# Patient Record
Sex: FEMALE | Race: WHITE | Employment: UNEMPLOYED | URBAN - METROPOLITAN AREA
[De-identification: names, ages, dates, MRNs, and addresses within clinical notes are randomized per-mention and may not be internally consistent; named-entity substitution may affect disease eponyms.]

---

## 2018-04-13 PROCEDURE — 81003 URINALYSIS AUTO W/O SCOPE: CPT | Performed by: FAMILY MEDICINE

## 2018-04-13 PROCEDURE — 82607 VITAMIN B-12: CPT | Performed by: FAMILY MEDICINE

## 2018-04-13 PROCEDURE — 87086 URINE CULTURE/COLONY COUNT: CPT | Performed by: FAMILY MEDICINE

## 2018-04-13 PROCEDURE — 82746 ASSAY OF FOLIC ACID SERUM: CPT | Performed by: FAMILY MEDICINE

## 2018-06-06 PROCEDURE — 88175 CYTOPATH C/V AUTO FLUID REDO: CPT | Performed by: INTERNAL MEDICINE

## 2018-11-02 PROCEDURE — 83516 IMMUNOASSAY NONANTIBODY: CPT | Performed by: FAMILY MEDICINE

## 2022-01-01 ENCOUNTER — HOSPITAL ENCOUNTER (OUTPATIENT)
Age: 37
Discharge: HOME | End: 2022-01-01
Payer: COMMERCIAL

## 2022-01-01 VITALS
RESPIRATION RATE: 18 BRPM | HEART RATE: 84 BPM | OXYGEN SATURATION: 100 % | WEIGHT: 105 LBS | BODY MASS INDEX: 18.61 KG/M2 | TEMPERATURE: 98 F | HEIGHT: 63 IN | DIASTOLIC BLOOD PRESSURE: 60 MMHG | SYSTOLIC BLOOD PRESSURE: 97 MMHG

## 2022-01-01 DIAGNOSIS — B34.9 VIRAL ILLNESS: Primary | ICD-10-CM

## 2022-01-01 LAB — S PYO AG THROAT QL: NEGATIVE

## 2022-01-01 PROCEDURE — 99203 OFFICE O/P NEW LOW 30 MIN: CPT | Performed by: NURSE PRACTITIONER

## 2022-01-01 PROCEDURE — 87880 STREP A ASSAY W/OPTIC: CPT | Performed by: NURSE PRACTITIONER

## 2022-01-01 NOTE — ED PROVIDER NOTES
Patient Seen in: [facility]      History   Patient presents with:  Sore Throat    Stated Complaint: sore throat    Subjective:   HPI    42 Yo arrives to the ic with co sore throat, tolerating secretions, phonation normal, neck supple, uvula m Ear: External ear normal.      Left Ear: External ear normal.      Nose: Nose normal.      Mouth/Throat:      Mouth: Mucous membranes are moist.      Pharynx: Oropharynx is clear. Uvula midline.  No oropharyngeal exudate, posterior oropharyngeal erythema or explained that emergent conditions may arise and to go to the ER for new, worsening or any persistent conditions. I've explained the importance of following up with their doctor as instructed.  The patient verbalized understanding of the discharge instructi

## 2022-01-01 NOTE — ED INITIAL ASSESSMENT (HPI)
Pt here w c/o sore throat since yesterday and pain w swallowing. No cough, no fever, no runny nose or congestion. Pt states she is concerned to strep. Pt states she just had covid 3 wks ago.

## 2022-06-23 ENCOUNTER — LAB ENCOUNTER (OUTPATIENT)
Dept: LAB | Age: 37
End: 2022-06-23
Attending: STUDENT IN AN ORGANIZED HEALTH CARE EDUCATION/TRAINING PROGRAM
Payer: COMMERCIAL

## 2022-06-23 DIAGNOSIS — R74.8 ELEVATED VITAMIN B12 LEVEL: ICD-10-CM

## 2022-06-23 LAB
ALBUMIN SERPL-MCNC: 4 G/DL (ref 3.4–5)
ALP LIVER SERPL-CCNC: 45 U/L
ALT SERPL-CCNC: 16 U/L
AST SERPL-CCNC: 17 U/L (ref 15–37)
BILIRUB DIRECT SERPL-MCNC: <0.1 MG/DL (ref 0–0.2)
BILIRUB SERPL-MCNC: 0.5 MG/DL (ref 0.1–2)
PROT SERPL-MCNC: 7.5 G/DL (ref 6.4–8.2)
VIT B12 SERPL-MCNC: 737 PG/ML (ref 193–986)

## 2022-06-23 PROCEDURE — 36415 COLL VENOUS BLD VENIPUNCTURE: CPT

## 2022-06-23 PROCEDURE — 82607 VITAMIN B-12: CPT

## 2022-06-23 PROCEDURE — 80076 HEPATIC FUNCTION PANEL: CPT

## 2022-07-18 DIAGNOSIS — F41.8 DEPRESSION WITH ANXIETY: ICD-10-CM

## 2022-07-18 RX ORDER — SERTRALINE HYDROCHLORIDE 25 MG/1
TABLET, FILM COATED ORAL
Qty: 30 TABLET | Refills: 0 | Status: SHIPPED | OUTPATIENT
Start: 2022-07-18

## 2022-10-25 DIAGNOSIS — F41.8 DEPRESSION WITH ANXIETY: ICD-10-CM

## 2022-12-29 ENCOUNTER — PATIENT MESSAGE (OUTPATIENT)
Dept: FAMILY MEDICINE CLINIC | Facility: CLINIC | Age: 37
End: 2022-12-29

## 2022-12-30 ENCOUNTER — NURSE TRIAGE (OUTPATIENT)
Dept: FAMILY MEDICINE CLINIC | Facility: CLINIC | Age: 37
End: 2022-12-30

## 2022-12-30 DIAGNOSIS — F41.8 DEPRESSION WITH ANXIETY: ICD-10-CM

## 2022-12-30 DIAGNOSIS — B00.2 ORAL HERPES: Primary | ICD-10-CM

## 2022-12-30 RX ORDER — VALACYCLOVIR HYDROCHLORIDE 500 MG/1
500 TABLET, FILM COATED ORAL DAILY
Qty: 90 TABLET | Refills: 0 | Status: SHIPPED | OUTPATIENT
Start: 2022-12-30

## 2022-12-30 NOTE — TELEPHONE ENCOUNTER
From: [place]  To: Sanjuanita Henderson MD  Sent: 12/29/2022 12:39 PM CST  Subject: Valtrex    Hi Dr. Daphnie Orozco,    I have been getting cold sores on my lips and was wondering if I could get a prescription of Valtrex. I usually get a few of them in the winter and taking Valtrex has helped reduce them. If you could let me know, that would be great! Thanks!     [facility]

## 2023-04-07 ENCOUNTER — TELEPHONE (OUTPATIENT)
Dept: FAMILY MEDICINE CLINIC | Facility: CLINIC | Age: 38
End: 2023-04-07

## 2023-04-07 ENCOUNTER — LAB ENCOUNTER (OUTPATIENT)
Dept: LAB | Age: 38
End: 2023-04-07
Attending: STUDENT IN AN ORGANIZED HEALTH CARE EDUCATION/TRAINING PROGRAM
Payer: COMMERCIAL

## 2023-04-07 DIAGNOSIS — G47.11 IDIOPATHIC HYPERSOMNIA: ICD-10-CM

## 2023-04-07 DIAGNOSIS — R53.83 FATIGUE, UNSPECIFIED TYPE: Primary | ICD-10-CM

## 2023-04-07 DIAGNOSIS — R53.83 FATIGUE, UNSPECIFIED TYPE: ICD-10-CM

## 2023-04-07 LAB
ALBUMIN SERPL-MCNC: 4.3 G/DL (ref 3.4–5)
ALBUMIN/GLOB SERPL: 1.2 {RATIO} (ref 1–2)
ALP LIVER SERPL-CCNC: 60 U/L
ALT SERPL-CCNC: 19 U/L
ANION GAP SERPL CALC-SCNC: 4 MMOL/L (ref 0–18)
AST SERPL-CCNC: 13 U/L (ref 15–37)
BILIRUB SERPL-MCNC: 0.4 MG/DL (ref 0.1–2)
BUN BLD-MCNC: 21 MG/DL (ref 7–18)
BUN/CREAT SERPL: 29.2 (ref 10–20)
CALCIUM BLD-MCNC: 9.1 MG/DL (ref 8.5–10.1)
CHLORIDE SERPL-SCNC: 107 MMOL/L (ref 98–112)
CHOLEST SERPL-MCNC: 147 MG/DL (ref ?–200)
CO2 SERPL-SCNC: 27 MMOL/L (ref 21–32)
CREAT BLD-MCNC: 0.72 MG/DL
DEPRECATED RDW RBC AUTO: 45 FL (ref 35.1–46.3)
ERYTHROCYTE [DISTWIDTH] IN BLOOD BY AUTOMATED COUNT: 13.1 % (ref 11–15)
FASTING PATIENT LIPID ANSWER: NO
FASTING STATUS PATIENT QL REPORTED: NO
GFR SERPLBLD BASED ON 1.73 SQ M-ARVRAT: 109 ML/MIN/1.73M2 (ref 60–?)
GLOBULIN PLAS-MCNC: 3.5 G/DL (ref 2.8–4.4)
GLUCOSE BLD-MCNC: 95 MG/DL (ref 70–99)
HCT VFR BLD AUTO: 38.1 %
HDLC SERPL-MCNC: 67 MG/DL (ref 40–59)
HGB BLD-MCNC: 12 G/DL
LDLC SERPL CALC-MCNC: 64 MG/DL (ref ?–100)
MCH RBC QN AUTO: 29.4 PG (ref 26–34)
MCHC RBC AUTO-ENTMCNC: 31.5 G/DL (ref 31–37)
MCV RBC AUTO: 93.4 FL
NONHDLC SERPL-MCNC: 80 MG/DL (ref ?–130)
OSMOLALITY SERPL CALC.SUM OF ELEC: 289 MOSM/KG (ref 275–295)
PLATELET # BLD AUTO: 235 10(3)UL (ref 150–450)
POTASSIUM SERPL-SCNC: 3.9 MMOL/L (ref 3.5–5.1)
PROT SERPL-MCNC: 7.8 G/DL (ref 6.4–8.2)
RBC # BLD AUTO: 4.08 X10(6)UL
SODIUM SERPL-SCNC: 138 MMOL/L (ref 136–145)
TRIGL SERPL-MCNC: 82 MG/DL (ref 30–149)
TSI SER-ACNC: 1.05 MIU/ML (ref 0.36–3.74)
VLDLC SERPL CALC-MCNC: 12 MG/DL (ref 0–30)
WBC # BLD AUTO: 8.2 X10(3) UL (ref 4–11)

## 2023-04-07 PROCEDURE — 80061 LIPID PANEL: CPT

## 2023-04-07 PROCEDURE — 36415 COLL VENOUS BLD VENIPUNCTURE: CPT

## 2023-04-07 PROCEDURE — 84443 ASSAY THYROID STIM HORMONE: CPT

## 2023-04-07 PROCEDURE — 85027 COMPLETE CBC AUTOMATED: CPT

## 2023-04-07 PROCEDURE — 80053 COMPREHEN METABOLIC PANEL: CPT

## 2023-04-07 NOTE — TELEPHONE ENCOUNTER
PA needed for Modafinil, Chart notes to match dx    Please advise on qualifying DX , Narcolepsy type 1, Narcolepsy type 2, Shift work sleep disorder, Idiopathic Hypersomnia, TEJAL-Hypopnea syndrome or Other

## 2023-04-08 ENCOUNTER — PATIENT MESSAGE (OUTPATIENT)
Dept: FAMILY MEDICINE CLINIC | Facility: CLINIC | Age: 38
End: 2023-04-08

## 2023-04-10 NOTE — TELEPHONE ENCOUNTER
From: [facility]  To: Gonzalo Campbell MD  Sent: 4/8/2023 1:42 PM CDT  Subject: Prescription     Hi Dr. Radha Mathews,    I went to  my prescription of Modafinil yesterday, and they need your authorization for the prescription to be processed through insurance. If you could sign off on that, that would be great! Thanks!     [facility]

## 2023-04-12 RX ORDER — DEXTROAMPHETAMINE SACCHARATE, AMPHETAMINE ASPARTATE MONOHYDRATE, DEXTROAMPHETAMINE SULFATE AND AMPHETAMINE SULFATE 2.5; 2.5; 2.5; 2.5 MG/1; MG/1; MG/1; MG/1
10 CAPSULE, EXTENDED RELEASE ORAL EVERY MORNING
Qty: 30 CAPSULE | Refills: 0 | Status: SHIPPED | OUTPATIENT
Start: 2023-04-12 | End: 2023-05-12

## 2023-04-12 NOTE — TELEPHONE ENCOUNTER
Prior authorization for modafinil has been denied. Please refer to denial letter that was faxed to the office. Denied    PA Case: [id number], Status: Denied. Notification: Completed.  Case ID: [id number]      Payer:  [employer]   Electronic appeal:  Not supported   View History

## 2023-05-24 ENCOUNTER — TELEMEDICINE (OUTPATIENT)
Dept: FAMILY MEDICINE CLINIC | Facility: CLINIC | Age: 38
End: 2023-05-24

## 2023-05-24 DIAGNOSIS — R53.83 FATIGUE, UNSPECIFIED TYPE: Primary | ICD-10-CM

## 2023-05-24 PROCEDURE — 99213 OFFICE O/P EST LOW 20 MIN: CPT | Performed by: STUDENT IN AN ORGANIZED HEALTH CARE EDUCATION/TRAINING PROGRAM

## 2023-05-24 RX ORDER — DEXTROAMPHETAMINE SACCHARATE, AMPHETAMINE ASPARTATE MONOHYDRATE, DEXTROAMPHETAMINE SULFATE AND AMPHETAMINE SULFATE 5; 5; 5; 5 MG/1; MG/1; MG/1; MG/1
20 CAPSULE, EXTENDED RELEASE ORAL EVERY MORNING
Qty: 30 CAPSULE | Refills: 0 | Status: SHIPPED | OUTPATIENT
Start: 2023-05-24 | End: 2023-06-23

## 2023-06-26 ENCOUNTER — PATIENT MESSAGE (OUTPATIENT)
Dept: FAMILY MEDICINE CLINIC | Facility: CLINIC | Age: 38
End: 2023-06-26

## 2023-06-26 DIAGNOSIS — B00.2 ORAL HERPES: ICD-10-CM

## 2023-06-27 ENCOUNTER — PATIENT OUTREACH (OUTPATIENT)
Dept: CASE MANAGEMENT | Age: 38
End: 2023-06-27

## 2023-06-27 ENCOUNTER — TELEPHONE (OUTPATIENT)
Dept: FAMILY MEDICINE CLINIC | Facility: CLINIC | Age: 38
End: 2023-06-27

## 2023-06-27 RX ORDER — DEXTROAMPHETAMINE SACCHARATE, AMPHETAMINE ASPARTATE MONOHYDRATE, DEXTROAMPHETAMINE SULFATE AND AMPHETAMINE SULFATE 5; 5; 5; 5 MG/1; MG/1; MG/1; MG/1
20 CAPSULE, EXTENDED RELEASE ORAL DAILY
Qty: 30 CAPSULE | Refills: 0 | Status: SHIPPED | OUTPATIENT
Start: 2023-07-28 | End: 2023-08-27

## 2023-06-27 RX ORDER — VALACYCLOVIR HYDROCHLORIDE 500 MG/1
500 TABLET, FILM COATED ORAL DAILY
Qty: 90 TABLET | Refills: 0 | Status: SHIPPED | OUTPATIENT
Start: 2023-06-27

## 2023-06-27 RX ORDER — DEXTROAMPHETAMINE SACCHARATE, AMPHETAMINE ASPARTATE MONOHYDRATE, DEXTROAMPHETAMINE SULFATE AND AMPHETAMINE SULFATE 5; 5; 5; 5 MG/1; MG/1; MG/1; MG/1
20 CAPSULE, EXTENDED RELEASE ORAL DAILY
Qty: 30 CAPSULE | Refills: 0 | Status: SHIPPED | OUTPATIENT
Start: 2023-08-28 | End: 2023-09-27

## 2023-06-27 RX ORDER — DEXTROAMPHETAMINE SACCHARATE, AMPHETAMINE ASPARTATE MONOHYDRATE, DEXTROAMPHETAMINE SULFATE AND AMPHETAMINE SULFATE 5; 5; 5; 5 MG/1; MG/1; MG/1; MG/1
20 CAPSULE, EXTENDED RELEASE ORAL DAILY
Qty: 30 CAPSULE | Refills: 0 | Status: SHIPPED | OUTPATIENT
Start: 2023-06-27 | End: 2023-07-27

## 2023-07-09 DIAGNOSIS — F41.8 DEPRESSION WITH ANXIETY: ICD-10-CM

## 2023-07-09 NOTE — TELEPHONE ENCOUNTER
Refill passed per [place] [facility] [place], [facility] protocol. Requested Prescriptions   Pending Prescriptions Disp Refills    SERTRALINE 50 MG Oral Tab [Pharmacy Med Name: SERTRALINE 50MG TABLETS] 135 tablet 0     Sig: TAKE 1 AND 1/2 TABLETS(75 MG) BY MOUTH DAILY.  INCREASE  MG 5 DAYS BEFORE ONSET OF MENSES, THROUGH DAY 5 OF MENSES       Psychiatric Non-Scheduled (Anti-Anxiety) Passed - 7/9/2023 12:39 PM        Passed - In person appointment or virtual visit in the past 6 mos or appointment in next 3 mos     Recent Outpatient Visits              1 month ago Fatigue, unspecified type    [facility], [address] [place] Bateman, MD    Telemedicine    3 months ago Depression with anxiety    Ashly Vel[facility]quez Conway Bateman, MD    Office Visit    1 year ago Depression with anxiety    Ashly Velázquez Conway Bateman, MD    Office Visit    2 years ago Anxiety disorder, unspecified type    [facility] - [address], Maritza Canada DO    Office Visit    3 years ago Recurrent cold sores    [facility] - Danica Dey MD    Office Visit                         Recent Outpatient Visits              1 month ago Fatigue, unspecified type    [address] [place], [address] Conway Bateman, MD    Telemedicine    3 months ago Depression with anxiety    Ashly Velázquez Conway Bateman, MD    Office Visit    1 year ago Depression with anxiety    Ashly Velázquez [place] Bateman, MD    Office Visit    2 years ago Anxiety disorder, unspecified type    [facility] - [address], Maritza Canada DO    Office Visit    3 years ago Recurrent cold sores    Family Medicine - Danica Dey MD    Office Visit

## 2023-08-10 RX ORDER — DEXTROAMPHETAMINE SACCHARATE, AMPHETAMINE ASPARTATE MONOHYDRATE, DEXTROAMPHETAMINE SULFATE AND AMPHETAMINE SULFATE 5; 5; 5; 5 MG/1; MG/1; MG/1; MG/1
20 CAPSULE, EXTENDED RELEASE ORAL DAILY
Qty: 30 CAPSULE | Refills: 0 | OUTPATIENT
Start: 2023-08-10 | End: 2023-09-09

## 2023-08-31 PROBLEM — E55.9 VITAMIN D DEFICIENCY: Status: ACTIVE | Noted: 2023-08-31

## 2023-08-31 PROBLEM — E53.8 COBALAMIN DEFICIENCY: Status: ACTIVE | Noted: 2023-08-31

## 2023-08-31 PROBLEM — D64.9 ANEMIA: Status: ACTIVE | Noted: 2023-08-31

## 2023-08-31 PROBLEM — F41.9 ANXIETY: Status: ACTIVE | Noted: 2023-08-31

## 2023-09-06 ENCOUNTER — OFFICE VISIT (OUTPATIENT)
Dept: FAMILY MEDICINE CLINIC | Facility: CLINIC | Age: 38
End: 2023-09-06

## 2023-09-06 VITALS
DIASTOLIC BLOOD PRESSURE: 71 MMHG | BODY MASS INDEX: 17.36 KG/M2 | HEART RATE: 99 BPM | RESPIRATION RATE: 14 BRPM | SYSTOLIC BLOOD PRESSURE: 106 MMHG | OXYGEN SATURATION: 98 % | HEIGHT: 63 IN | WEIGHT: 98 LBS

## 2023-09-06 DIAGNOSIS — F41.8 DEPRESSION WITH ANXIETY: ICD-10-CM

## 2023-09-06 DIAGNOSIS — G47.11 IDIOPATHIC HYPERSOMNIA: ICD-10-CM

## 2023-09-06 DIAGNOSIS — R41.3 MEMORY CHANGES: Primary | ICD-10-CM

## 2023-09-06 DIAGNOSIS — Z12.31 ENCOUNTER FOR SCREENING MAMMOGRAM FOR MALIGNANT NEOPLASM OF BREAST: ICD-10-CM

## 2023-09-06 PROCEDURE — 99213 OFFICE O/P EST LOW 20 MIN: CPT | Performed by: STUDENT IN AN ORGANIZED HEALTH CARE EDUCATION/TRAINING PROGRAM

## 2023-09-07 ENCOUNTER — APPOINTMENT (OUTPATIENT)
Dept: CT IMAGING | Facility: HOSPITAL | Age: 38
End: 2023-09-07
Attending: EMERGENCY MEDICINE

## 2023-09-07 PROCEDURE — 70450 CT HEAD/BRAIN W/O DYE: CPT | Performed by: EMERGENCY MEDICINE

## 2023-09-18 ENCOUNTER — TELEPHONE (OUTPATIENT)
Dept: FAMILY MEDICINE CLINIC | Facility: CLINIC | Age: 38
End: 2023-09-18

## 2023-09-20 ENCOUNTER — OFFICE VISIT (OUTPATIENT)
Dept: NEUROLOGY | Facility: CLINIC | Age: 38
End: 2023-09-20
Payer: COMMERCIAL

## 2023-09-20 VITALS — SYSTOLIC BLOOD PRESSURE: 106 MMHG | HEART RATE: 71 BPM | DIASTOLIC BLOOD PRESSURE: 71 MMHG | OXYGEN SATURATION: 100 %

## 2023-09-20 DIAGNOSIS — Z86.59 H/O: DEPRESSION: ICD-10-CM

## 2023-09-20 DIAGNOSIS — Z86.59 HISTORY OF PANIC ATTACKS: ICD-10-CM

## 2023-09-20 DIAGNOSIS — G31.84 MILD COGNITIVE IMPAIRMENT: ICD-10-CM

## 2023-09-20 DIAGNOSIS — Z91.89 H/O ANXIETY STATE: ICD-10-CM

## 2023-09-20 DIAGNOSIS — R41.3 MEMORY PROBLEM: Primary | ICD-10-CM

## 2023-09-20 NOTE — PROGRESS NOTES
[facility]  [address] [facility], [address] [place] [address]  [phone]              Date: September 20, 2023  Patient Name: Raffy Ruvalcaba   MRN: [id number]    Reason for Evaluation: memory problem     HPI:     Raffy Ruvalcaba is a 34year old female with PMHx of anxiety, vitamin D deficiency, cobalamin deficiency  who comes in for evaluation of her cognitive impairment. History was obtained from the patient and medical records. The patient lives with her spouse and 4 kids - boys   Working condition - part time, school psychologist  Level of education  - masters   Patient was at her baseline until almost 1 year ago. Symptoms were initially noticed by the patient and family, have been going on for the past 6 months, and progressively getting worse. The patient and the family have noticed ongoing problems with learning and retaining new information, trouble remembering events or names, forgetts appointments. She sometimes forget what she was talking about, sometimes she goes upstairs and forget why she went up. Endorses problems with language - word finding difficulty  Endorses changes in personality and changes in mood - more irritable  Reports  inability to perform learned tasks writing reports, typing and spelling words. Reports  episodes of intermittent confusion while driving and when talking     Patient is able to manage his ADLs (activities of daily living) including showering, cleaning and dressing. Cooking- no problem    Patient denies problems handling complex tasks for example balancing a checkbook, problems with reasoning i.e. unable to cope with unexpected events. The patient denies any hallucinations or behavior changes. Patient denies  problems with spatial ability and orientation, specifically getting lost in familiar places. Currently she drives. Sometimes while driving she forgets where she is driving and zones out.   No accidents reported by patient or family. Patient denies history of tremors, falls, and gait difficulty     Patient denies significant fluctuations of the symptoms on day to day basis. Patient reports lost interest in her  hobbies/activites. Family history of dementia: Maternal grandmother with dementia in her 76s. History of traumatic brain injury/brain surgery/Stroke/seizures: no  History of Depression: yes, on medications and still feels depressed   H/o Anxiety and panic attacks  Endorses a lot of stress due to kids and spouse traveling a lot. She also endorses worsening headaches and blurry vision for past year. Headache started a month ago, located at the front of the head, 5/10 in intensity, last for 1 hour to few hours. No other symptoms with headache, feels better on laying down. Patient had an episode of panic attack last week that lasted for around 2 hours, she was evaluated in the ER, she was having an anxiety attack worse than her usual, complaining of dizziness, tingling in bilateral arms, shortness of breath and chest tightness along with confusion and difficulty with concentrating. CT head was done which was reported to be unremarkable. Last panic attack was 3 months ago. Taking Adderal - helps with being alert and energy levels, anxiety might have worsen    OUTPATIENT MEDICATIONS  valACYclovir (VALTREX) 500 MG Oral Tab, Take 1 tablet (500 mg total) by mouth daily. (Patient taking differently: Take 1 tablet (500 mg total) by mouth daily as needed.), Disp: 90 tablet, Rfl: 0  Amphetamine-Dextroamphet ER (ADDERALL XR) 20 MG Oral Capsule SR 24 Hr, Take 1 capsule (20 mg total) by mouth daily. , Disp: 30 capsule, Rfl: 0  LORAZEPAM 1 MG Oral Tab, Take 1 tablet (1 mg total) by mouth 2 (two) times daily as needed for Anxiety. (Patient not taking: Reported on 9/20/2023), Disp: 8 tablet, Rfl: 0  sertraline 50 MG Oral Tab, TAKE 1 AND 1/2 TABLETS(75 MG) BY MOUTH DAILY.  INCREASE  MG 5 DAYS BEFORE ONSET OF MENSES, THROUGH DAY 5 OF MENSES (Patient taking differently: Take 1 tablet (50 mg total) by mouth daily. TAKE 1 AND 1/2 TABLETS(75 MG) BY MOUTH DAILY. INCREASE  MG 5 DAYS BEFORE ONSET OF MENSES, THROUGH DAY 5 OF MENSES), Disp: 135 tablet, Rfl: 0  Ganirelix Acetate 250 MCG/0.5ML Subcutaneous Solution Prefilled Syringe, Inject 0.5 mL into the skin As Directed. (Patient not taking: Reported on 4/7/2023), Disp: , Rfl:   valACYclovir 1 G Oral Tab, , Disp: , Rfl:     No current facility-administered medications on file prior to visit. MEDICAL HISTORY  Past Medical History:   Diagnosis Date    Anxiety     Menses painful     Miscarriage within last 12 months     Vitamin D deficiency        SURGICAL HISTORY  History reviewed. No pertinent surgical history. SOCIAL HISTORY  Social History    Socioeconomic History      Marital status:     Tobacco Use      Smoking status: Never      Smokeless tobacco: Never    Vaping Use      Vaping Use: Never used    Substance and Sexual Activity      Alcohol use: Yes        Alcohol/week: 1.0 standard drink of alcohol        Types: 1 Glasses of wine per week        Comment: socially      Drug use: No      Sexual activity: Yes        Partners: Male        Comment: no OCP or protection      FAMILY HISTORY  Family History   Problem Relation Age of Onset    Arthritis Mother         Sjorgrens and RA    Other (Other) Mother         hypothyroid    Hypertension Father     Other (Other) Sister         hypothyroid       ALLERGIES  No Known Allergies    REVIEW OF SYSTEMS:   13-point review of systems was done and is negative unless otherwise stated in HPI. PHYSICAL EXAM:   /71   Pulse 71   LMP 08/26/2023 (Approximate)   SpO2 100%   General appearance: Well appearing, alert and in no acute distress  Skin: skin color normal.  No rashes or lesions. Head: Normocephalic, atraumatic.     Neurological exam:    Mental status   Alert and oriented   Attention/Concentration: impaired  Fund of knowledge: intact  Speech: no dysarthria or aphasia      Cranial nerves   II - visual fields intact to confrontation                                                III, IV, VI - extra-ocular muscles full: no pupillary defect; no NEHEMIAS, no nystagmus, no ptosis   V - normal facial sensation                                                               VII - normal facial symmetry                                                             VIII - intact hearing                                                                             IX, X - symmetrical palate                                                                  XI - symmetrical shoulder shrug                                                       XII - midline tongue without atrophy or fasciculation     Motor function  Normal muscle bulk and tone  Muscle strength: normal power 5/5     Sensory function Intact to touch in bilateral upper and lower extremities. Cerebellar Finger to nose: no ataxia or dysmetria   No involuntary movements or tremors     Reflex function Intact 2+ DTR and symmetric. Negative Babinski     Gait                  Arises independently;   normal posture; gait stable   Heel, toe walking performed without difficulty.    Tandem walking performed without much difficulty         LABS/DATA:  CBC  Component  Ref Range & Units 9/7/23  7:55 PM   WBC  4.0 - 11.0 x10(3) uL 13.0 High    RBC  3.80 - 5.30 x10(6)uL 3.87   HGB  12.0 - 16.0 g/dL 11.9 Low    HCT  35.0 - 48.0 % 35.8   MCV  80.0 - 100.0 fL 92.5   MCH  26.0 - 34.0 pg 30.7   MCHC  31.0 - 37.0 g/dL 33.2   RDW-SD  35.1 - 46.3 fL 45.6   RDW  11.0 - 15.0 % 13.4   PLT  150.0 - 450.0 10(3)uL 260.0       BMP  Component  Ref Range & Units 9/7/23  7:55 PM   Glucose  70 - 99 mg/dL 90   Sodium  136 - 145 mmol/L 141   Potassium  3.5 - 5.1 mmol/L 3.5   Chloride  98 - 112 mmol/L 111   CO2  21.0 - 32.0 mmol/L 24.0   Anion Gap  0 - 18 mmol/L 6   BUN  7 - 18 mg/dL 20 High Creatinine  0.55 - 1.02 mg/dL 0.79   BUN/CREA Ratio  10.0 - 20.0 25.3 High    Calcium, Total  8.5 - 10.1 mg/dL 9.1   Calculated Osmolality  275 - 295 mOsm/kg 294   eGFR-Cr  >=60 mL/min/1.73m2 97     IMAGING:    CT head 9/7/2023  CONCLUSION: Normal examination. ASSESSMENT:    Maria T Gaitan is a 34year old female with PMHx of anxiety, vitamin D deficiency, cobalamin deficiency  who comes in for evaluation of her cognitive impairment. 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 MOCA in the clinic 9/20/2023 : 25 /30, patient had difficulty with visual-spatial/executive functioning, had problem copying the cube and difficulty with delayed recall       History of anxiety and depression  History of panic attacks    PLAN:   Ordered MRI Brain without contrast to rule out any structural or vascular etiology contributing to patient's cognitive impairment. Ordered EEG to rule out any epileptiform discharges    Will check Vit B12, TSH    Ordered Neuropsychological evaluation for cognitive impairment. Refer to psychiatrist for further management of anxiety and depression    Driving restrictions was discussed with the patient, as patient endorses zoning out while driving and forgetting where she is going while driving. Follow up in the clinic in 2- 3 months   Instructed patient to call the clinic if symptoms worsen or develop any new symptoms. This note was prepared using [employer] [facility] [address] voice recognition dictation software and as a result, errors may occur. When identified, these errors have been corrected.  While every attempt is made to correct errors during dictation, discrepancies may still exist    Manuela Proctor MD,

## 2023-09-25 ENCOUNTER — LAB ENCOUNTER (OUTPATIENT)
Dept: LAB | Age: 38
End: 2023-09-25
Attending: STUDENT IN AN ORGANIZED HEALTH CARE EDUCATION/TRAINING PROGRAM
Payer: COMMERCIAL

## 2023-09-25 DIAGNOSIS — R41.3 MEMORY CHANGES: ICD-10-CM

## 2023-09-25 DIAGNOSIS — R41.3 MEMORY PROBLEM: ICD-10-CM

## 2023-09-25 LAB
DHEA-S SERPL-MCNC: 110.4 UG/DL
ESTRADIOL SERPL-MCNC: 47.7 PG/ML
FSH SERPL-ACNC: 6.5 MIU/ML
PROGEST SERPL-MCNC: 0.91 NG/ML
PROLACTIN SERPL-MCNC: 6 NG/ML
TSI SER-ACNC: 1.19 MIU/ML (ref 0.36–3.74)
VIT B12 SERPL-MCNC: 627 PG/ML (ref 193–986)

## 2023-09-25 PROCEDURE — 83520 IMMUNOASSAY QUANT NOS NONAB: CPT

## 2023-09-25 PROCEDURE — 36415 COLL VENOUS BLD VENIPUNCTURE: CPT

## 2023-09-25 PROCEDURE — 82670 ASSAY OF TOTAL ESTRADIOL: CPT

## 2023-09-25 PROCEDURE — 83001 ASSAY OF GONADOTROPIN (FSH): CPT

## 2023-09-25 PROCEDURE — 84443 ASSAY THYROID STIM HORMONE: CPT

## 2023-09-25 PROCEDURE — 84410 TESTOSTERONE BIOAVAILABLE: CPT

## 2023-09-25 PROCEDURE — 84146 ASSAY OF PROLACTIN: CPT

## 2023-09-25 PROCEDURE — 82607 VITAMIN B-12: CPT

## 2023-09-25 PROCEDURE — 84144 ASSAY OF PROGESTERONE: CPT

## 2023-09-25 PROCEDURE — 82627 DEHYDROEPIANDROSTERONE: CPT

## 2023-09-27 LAB — MULLERIAN AMH: 1.91 NG/ML

## 2023-09-28 LAB
SEX HORM BIND GLOB: 91.3 NMOL/L
TESTOST % FREE+WEAK BND: 10.1 %
TESTOST FREE+WEAK BND: 1.9 NG/DL
TESTOSTERONE TOT /MS: 18.5 NG/DL

## 2023-10-27 ENCOUNTER — APPOINTMENT (OUTPATIENT)
Dept: GENERAL RADIOLOGY | Age: 38
End: 2023-10-27
Attending: PHYSICIAN ASSISTANT

## 2023-10-27 ENCOUNTER — HOSPITAL ENCOUNTER (OUTPATIENT)
Age: 38
Discharge: HOME | End: 2023-10-27

## 2023-10-27 VITALS
RESPIRATION RATE: 16 BRPM | HEART RATE: 83 BPM | DIASTOLIC BLOOD PRESSURE: 69 MMHG | SYSTOLIC BLOOD PRESSURE: 99 MMHG | OXYGEN SATURATION: 100 % | TEMPERATURE: 98 F

## 2023-10-27 DIAGNOSIS — S69.91XA INJURY OF FINGER OF RIGHT HAND, INITIAL ENCOUNTER: ICD-10-CM

## 2023-10-27 DIAGNOSIS — M79.641 PAIN OF RIGHT HAND: Primary | ICD-10-CM

## 2023-10-27 PROCEDURE — 73130 X-RAY EXAM OF HAND: CPT | Performed by: PHYSICIAN ASSISTANT

## 2023-10-27 PROCEDURE — 99213 OFFICE O/P EST LOW 20 MIN: CPT | Performed by: PHYSICIAN ASSISTANT

## 2023-10-27 NOTE — ED INITIAL ASSESSMENT (HPI)
C/o Rt hand Rt 5th digit pain stated hit hand on wall yesterday by accident.    + swelling and bruising

## 2023-10-31 ENCOUNTER — PATIENT MESSAGE (OUTPATIENT)
Dept: FAMILY MEDICINE CLINIC | Facility: CLINIC | Age: 38
End: 2023-10-31

## 2023-11-02 RX ORDER — DEXTROAMPHETAMINE SACCHARATE, AMPHETAMINE ASPARTATE MONOHYDRATE, DEXTROAMPHETAMINE SULFATE AND AMPHETAMINE SULFATE 5; 5; 5; 5 MG/1; MG/1; MG/1; MG/1
20 CAPSULE, EXTENDED RELEASE ORAL DAILY
Qty: 30 CAPSULE | Refills: 0 | Status: SHIPPED | OUTPATIENT
Start: 2023-12-03 | End: 2024-01-02

## 2023-11-02 RX ORDER — DEXTROAMPHETAMINE SACCHARATE, AMPHETAMINE ASPARTATE MONOHYDRATE, DEXTROAMPHETAMINE SULFATE AND AMPHETAMINE SULFATE 5; 5; 5; 5 MG/1; MG/1; MG/1; MG/1
20 CAPSULE, EXTENDED RELEASE ORAL DAILY
Qty: 30 CAPSULE | Refills: 0 | Status: SHIPPED | OUTPATIENT
Start: 2023-11-02 | End: 2023-12-02

## 2023-11-02 RX ORDER — DEXTROAMPHETAMINE SACCHARATE, AMPHETAMINE ASPARTATE MONOHYDRATE, DEXTROAMPHETAMINE SULFATE AND AMPHETAMINE SULFATE 5; 5; 5; 5 MG/1; MG/1; MG/1; MG/1
20 CAPSULE, EXTENDED RELEASE ORAL DAILY
Qty: 30 CAPSULE | Refills: 0 | Status: SHIPPED | OUTPATIENT
Start: 2024-01-03 | End: 2024-02-02

## 2023-11-02 NOTE — TELEPHONE ENCOUNTER
Please review. Protocol failed / Has no protocol. Requested Prescriptions   Pending Prescriptions Disp Refills    Amphetamine-Dextroamphet ER (ADDERALL XR) 20 MG Oral Capsule SR 24 Hr 30 capsule 0     Sig: Take 1 capsule (20 mg total) by mouth daily. There is no refill protocol information for this order       Amphetamine-Dextroamphet ER (ADDERALL XR) 20 MG Oral Capsule SR 24 Hr 30 capsule 0     Sig: Take 1 capsule (20 mg total) by mouth daily. There is no refill protocol information for this order       Amphetamine-Dextroamphet ER (ADDERALL XR) 20 MG Oral Capsule SR 24 Hr 30 capsule 0     Sig: Take 1 capsule (20 mg total) by mouth daily.        There is no refill protocol information for this order           Recent Outpatient Visits              1 month ago Memory problem    Breanna [place], [place]    Office Visit    1 month ago Memory changes    Ashly Fry Nicoletta Danger, MD    Office Visit    5 months ago Fatigue, unspecified type    Ashly Fry Nicoletta Danger, MD    Telemedicine    6 months ago Depression with anxiety    Ashly Fry Nicoletta Danger, MD    Office Visit    1 year ago Depression with anxiety    Ashly Fry Nicoletta Danger, MD    Office Visit

## 2023-11-02 NOTE — TELEPHONE ENCOUNTER
From: [place]  To: Andreyjewels Castellano  Sent: 10/31/2023 9:29 AM CDT  Subject: Adderall     Hi! I wanted to see if I could get a refill for Adderall. For some reason, I need to request it every month. Thanks!      Tara

## 2023-11-29 ENCOUNTER — HOSPITAL ENCOUNTER (OUTPATIENT)
Dept: MAMMOGRAPHY | Age: 38
Discharge: HOME | End: 2023-11-29
Attending: STUDENT IN AN ORGANIZED HEALTH CARE EDUCATION/TRAINING PROGRAM
Payer: COMMERCIAL

## 2023-11-29 DIAGNOSIS — Z12.31 ENCOUNTER FOR SCREENING MAMMOGRAM FOR MALIGNANT NEOPLASM OF BREAST: ICD-10-CM

## 2023-11-29 PROCEDURE — 77067 SCR MAMMO BI INCL CAD: CPT | Performed by: STUDENT IN AN ORGANIZED HEALTH CARE EDUCATION/TRAINING PROGRAM

## 2023-11-29 PROCEDURE — 77063 BREAST TOMOSYNTHESIS BI: CPT | Performed by: STUDENT IN AN ORGANIZED HEALTH CARE EDUCATION/TRAINING PROGRAM

## 2023-12-04 ENCOUNTER — TELEMEDICINE (OUTPATIENT)
Dept: FAMILY MEDICINE CLINIC | Facility: CLINIC | Age: 38
End: 2023-12-04
Payer: COMMERCIAL

## 2023-12-04 ENCOUNTER — HOSPITAL ENCOUNTER (OUTPATIENT)
Dept: GENERAL RADIOLOGY | Age: 38
Discharge: HOME | End: 2023-12-04
Attending: STUDENT IN AN ORGANIZED HEALTH CARE EDUCATION/TRAINING PROGRAM
Payer: COMMERCIAL

## 2023-12-04 DIAGNOSIS — M25.541 METACARPOPHALANGEAL JOINT PAIN OF RIGHT HAND: Primary | ICD-10-CM

## 2023-12-04 DIAGNOSIS — M25.541 METACARPOPHALANGEAL JOINT PAIN OF RIGHT HAND: ICD-10-CM

## 2023-12-04 PROCEDURE — 73140 X-RAY EXAM OF FINGER(S): CPT | Performed by: STUDENT IN AN ORGANIZED HEALTH CARE EDUCATION/TRAINING PROGRAM

## 2023-12-04 RX ORDER — ACYCLOVIR 400 MG/1
TABLET ORAL
COMMUNITY

## 2023-12-04 RX ORDER — FOLLITROPIN 975 [IU]/1.17ML
INJECTION, SOLUTION SUBCUTANEOUS
COMMUNITY

## 2023-12-04 RX ORDER — DIAZEPAM 10 MG/1
TABLET ORAL
COMMUNITY

## 2023-12-04 RX ORDER — CHORIONIC GONADOTROPIN 10000 UNIT
KIT INTRAMUSCULAR
COMMUNITY

## 2023-12-04 RX ORDER — DEXAMETHASONE 0.5 MG/1
TABLET ORAL
COMMUNITY

## 2023-12-04 RX ORDER — NAPROXEN 500 MG/1
500 TABLET ORAL 2 TIMES DAILY WITH MEALS
Qty: 30 TABLET | Refills: 0 | Status: SHIPPED | OUTPATIENT
Start: 2023-12-04

## 2023-12-04 NOTE — PROGRESS NOTES
Virtual Telephone Check-In    Adrien Vargas verbally consents to a Virtual/Telephone Check-In visit on 12/04/23. Patient has been referred to the [facility] website at [email or web address] to review the yearly Consent to Treat document. Patient understands and accepts financial responsibility for any deductible, co-insurance and/or co-pays associated with this service. Duration of the service: 6 minutes      Summary of topics discussed:   Hand pain      Richard Patel MD      Telehealth outside of [facility] Consent   I conducted a telehealth visit with Adrien Vargas today, 12/04/23, which was completed using two-way, real-time interactive audio and video communication. This has been done in good awilda to provide continuity of care in the best interest of the provider-patient relationship, due to the COVID -19 public health crisis/national emergency where restrictions of face-to-face office visits are ongoing. Every conscious effort was taken to allow for sufficient and adequate time to complete the visit. The patient was made aware of the limitations of the telehealth visit, including treatment limitations as no physical exam could be performed. The patient was advised to call 911 or to go to the ER in case there was an emergency. The patient was also advised of the potential privacy & security concerns related to the telehealth platform. The patient was made aware of where to find [facility] notice of privacy practices, telehealth consent form and other related consent forms and documents. which are located on the [facility] website. The patient verbally agreed to telehealth consent form, related consents and the risks discussed. Lastly, the patient confirmed that they were in [place]. Included in this visit, time may have been spent reviewing labs, medications, radiology tests and decision making.  Appropriate medical decision-making and tests are ordered as detailed in the plan of care above. Coding/billing information is submitted for this visit based on complexity of care and/or time spent for the visit. HPI:    Patient ID: Valeriano Cole is a 34year old female. HPI  Pt presenting via video visit with hand pain. Reports striking Right hand end of Oct 2023 against a wall  Reports localized pain to medial hand along 5th digit  Seen at [facility] 10/[facility], hand XR unrevealing  Continues to have tenderness and pain with  or pressure  Denies any NSAID dosing    Review of Systems   A comprehensive 10 point review of systems was completed. Pertinent positives and negatives noted in the the HPI. Current Outpatient Medications   Medication Sig Dispense Refill    naproxen 500 MG Oral Tab Take 1 tablet (500 mg total) by mouth 2 (two) times daily with meals. 30 tablet 0    acyclovir 400 MG Oral Tab       Chorionic Gonadotropin (PREGNYL) 31113 units Intramuscular Recon Soln       dexamethasone 0.5 MG Oral Tab       diazePAM 10 MG Oral Tab       Follitropin Beta (FOLLISTIM AQ) 900 UNT/1.08ML Subcutaneous Solution       Amphetamine-Dextroamphet ER (ADDERALL XR) 20 MG Oral Capsule SR 24 Hr Take 1 capsule (20 mg total) by mouth daily. 30 capsule 0    [START ON 1/3/2024] Amphetamine-Dextroamphet ER (ADDERALL XR) 20 MG Oral Capsule SR 24 Hr Take 1 capsule (20 mg total) by mouth daily. 30 capsule 0    LORAZEPAM 1 MG Oral Tab Take 1 tablet (1 mg total) by mouth 2 (two) times daily as needed for Anxiety. (Patient not taking: Reported on 9/20/2023) 8 tablet 0    sertraline 50 MG Oral Tab TAKE 1 AND 1/2 TABLETS(75 MG) BY MOUTH DAILY. INCREASE  MG 5 DAYS BEFORE ONSET OF MENSES, THROUGH DAY 5 OF MENSES (Patient taking differently: Take 1 tablet (50 mg total) by mouth daily. TAKE 1 AND 1/2 TABLETS(75 MG) BY MOUTH DAILY. INCREASE  MG 5 DAYS BEFORE ONSET OF MENSES, THROUGH DAY 5 OF MENSES) 135 tablet 0    valACYclovir (VALTREX) 500 MG Oral Tab Take 1 tablet (500 mg total) by mouth daily.  (Patient taking differently: Take 1 tablet (500 mg total) by mouth daily as needed.) 90 tablet 0    Ganirelix Acetate 250 MCG/0.5ML Subcutaneous Solution Prefilled Syringe Inject 0.5 mL into the skin As Directed. (Patient not taking: Reported on 4/7/2023)      valACYclovir 1 G Oral Tab  (Patient not taking: Reported on 9/20/2023)       Allergies:No Known Allergies   There were no vitals filed for this visit. There is no height or weight on file to calculate BMI. PHYSICAL EXAM:   Physical Exam   Vitals signs taken at home if available:     Limited examination for this telephone visit due to the coronavirus emergency     Patient was speaking in complete sentences, no increased work of breathing and very coherent and alert on the phone. Alert and oriented x 3  Patient was responding to questions appropriately. Patient did not appear short of breath. ASSESSMENT/PLAN:   1. Metacarpophalangeal joint pain of right hand  Will recheck XR due to pain with finger movement  Discussed role of NSAIDs  Encouraged exercises at home  Follow-up with Ortho if no improvement  - XR FINGER(S) (MIN 2 VIEWS), RIGHT 5TH (CPT=73140); Future  - Ortho Referral - In Network  - naproxen 500 MG Oral Tab; Take 1 tablet (500 mg total) by mouth 2 (two) times daily with meals. Dispense: 30 tablet; Refill: 0    Pt verbalized understanding and agrees with plan. No orders of the defined types were placed in this encounter. Meds This Visit:  Requested Prescriptions     Signed Prescriptions Disp Refills    naproxen 500 MG Oral Tab 30 tablet 0     Sig: Take 1 tablet (500 mg total) by mouth 2 (two) times daily with meals.        Imaging & Referrals:  ORTHOPEDIC - INTERNAL         OW#[id number]

## 2023-12-17 RX ORDER — DEXTROAMPHETAMINE SACCHARATE, AMPHETAMINE ASPARTATE MONOHYDRATE, DEXTROAMPHETAMINE SULFATE AND AMPHETAMINE SULFATE 5; 5; 5; 5 MG/1; MG/1; MG/1; MG/1
20 CAPSULE, EXTENDED RELEASE ORAL DAILY
Qty: 30 CAPSULE | Refills: 0 | Status: SHIPPED | OUTPATIENT
Start: 2023-12-17 | End: 2024-01-16

## 2023-12-17 NOTE — TELEPHONE ENCOUNTER
Please review; protocol failed. Requested Prescriptions   Pending Prescriptions Disp Refills    Amphetamine-Dextroamphet ER (ADDERALL XR) 20 MG Oral Capsule SR 24 Hr 30 capsule 0     Sig: Take 1 capsule (20 mg total) by mouth daily.        There is no refill protocol information for this order          Future Appointments         Provider Department Appt Notes    In 2 days [facility] S[facility] CONSENT LINKED    In 1 month Isaiah Min MD [address], [address] [place] , [address] [facility] appt avail w Dr Sommer Cruz Visits              1 week ago Metacarpophalangeal joint pain of right hand    [address], [address] Maisie Friendly, MD    Telemedicine    2 months ago Memory problem    [address], [address] [facility], Umair Alvarez MD    Office Visit    3 months ago Memory changes    [facility] Ash[facility] Aguilar Maisie Friendly, MD    Office Visit    6 months ago Fatigue, unspecified type    [address], [address] Maisie Friendly, MD    Telemedicine    8 months ago Depression with anxiety    [address], [address] Maisie Friendly, MD    Office Visit immune

## 2023-12-19 ENCOUNTER — HOSPITAL ENCOUNTER (OUTPATIENT)
Dept: MAMMOGRAPHY | Facility: HOSPITAL | Age: 38
Discharge: HOME | End: 2023-12-19
Attending: STUDENT IN AN ORGANIZED HEALTH CARE EDUCATION/TRAINING PROGRAM
Payer: COMMERCIAL

## 2023-12-19 DIAGNOSIS — R92.2 INCONCLUSIVE MAMMOGRAM: ICD-10-CM

## 2023-12-19 PROCEDURE — 77066 DX MAMMO INCL CAD BI: CPT | Performed by: STUDENT IN AN ORGANIZED HEALTH CARE EDUCATION/TRAINING PROGRAM

## 2023-12-19 PROCEDURE — 76642 ULTRASOUND BREAST LIMITED: CPT | Performed by: STUDENT IN AN ORGANIZED HEALTH CARE EDUCATION/TRAINING PROGRAM

## 2023-12-19 PROCEDURE — 77062 BREAST TOMOSYNTHESIS BI: CPT | Performed by: STUDENT IN AN ORGANIZED HEALTH CARE EDUCATION/TRAINING PROGRAM

## 2023-12-20 ENCOUNTER — TELEPHONE (OUTPATIENT)
Dept: FAMILY MEDICINE CLINIC | Facility: CLINIC | Age: 38
End: 2023-12-20

## 2023-12-30 DIAGNOSIS — B00.2 ORAL HERPES: ICD-10-CM

## 2024-01-01 NOTE — TELEPHONE ENCOUNTER
Please review; protocol failed/ No protocol     Requested Prescriptions   Pending Prescriptions Disp Refills    valACYclovir (VALTREX) 500 MG Oral Tab 90 tablet 0     Sig: Take 1 tablet (500 mg total) by mouth daily.       There is no refill protocol information for this order          Recent Outpatient Visits              4 weeks ago Metacarpophalangeal joint pain of right hand    [facility]    [facility]    3 months ago [facility]    [facility]    [facility]    [facility]    7 months ago Fatigue, unspecified type    [facility]    [facility]    8 months ago Depression with anxiety    [facility]    [facility] Visit

## 2024-01-03 RX ORDER — VALACYCLOVIR HYDROCHLORIDE 500 MG/1
500 TABLET, FILM COATED ORAL DAILY PRN
Qty: 90 TABLET | Refills: 0 | Status: SHIPPED | OUTPATIENT
Start: 2024-01-03

## 2024-07-27 ENCOUNTER — TELEPHONE (OUTPATIENT)
Dept: FAMILY MEDICINE CLINIC | Facility: CLINIC | Age: 39
End: 2024-07-27

## 2024-08-12 ENCOUNTER — PATIENT MESSAGE (OUTPATIENT)
Dept: FAMILY MEDICINE CLINIC | Facility: CLINIC | Age: 39
End: 2024-08-12

## 2024-08-12 NOTE — TELEPHONE ENCOUNTER
From: Fifi Garcia  To: Analy Sánchez  Sent: 8/12/2024 10:58 AM CDT  Subject: Back pain    Good morning!    I threw out my back on Thursday from lifting a table, and I’m still having pain in my lower back. It’s difficult to walk, my back is stiff, and I’m having muscle spasms. I’ve tried a heating pack and have been taking ibuprofen, but I’m still in pain. I just wanted to see if I should wait it out or if there is a specialist I should see. If you could let me know your recommendation, that would be great!    Thanks!    [place]

## 2024-08-23 ENCOUNTER — LAB ENCOUNTER (OUTPATIENT)
Dept: LAB | Age: 39
End: 2024-08-23
Attending: STUDENT IN AN ORGANIZED HEALTH CARE EDUCATION/TRAINING PROGRAM
Payer: COMMERCIAL

## 2024-08-23 ENCOUNTER — OFFICE VISIT (OUTPATIENT)
Dept: FAMILY MEDICINE CLINIC | Facility: CLINIC | Age: 39
End: 2024-08-23
Payer: COMMERCIAL

## 2024-08-23 VITALS
SYSTOLIC BLOOD PRESSURE: 86 MMHG | WEIGHT: 106 LBS | HEART RATE: 81 BPM | BODY MASS INDEX: 18.78 KG/M2 | TEMPERATURE: 97 F | OXYGEN SATURATION: 99 % | DIASTOLIC BLOOD PRESSURE: 56 MMHG | RESPIRATION RATE: 18 BRPM | HEIGHT: 63 IN

## 2024-08-23 DIAGNOSIS — R53.83 FATIGUE, UNSPECIFIED TYPE: ICD-10-CM

## 2024-08-23 DIAGNOSIS — Z00.00 WELL ADULT EXAM: ICD-10-CM

## 2024-08-23 DIAGNOSIS — Z12.4 SCREENING FOR CERVICAL CANCER: ICD-10-CM

## 2024-08-23 DIAGNOSIS — Z00.00 WELL ADULT EXAM: Primary | ICD-10-CM

## 2024-08-23 LAB
ALBUMIN SERPL-MCNC: 4.7 G/DL (ref 3.2–4.8)
ALBUMIN/GLOB SERPL: 1.6 {RATIO} (ref 1–2)
ALP LIVER SERPL-CCNC: 47 U/L
ALT SERPL-CCNC: 9 U/L
ANION GAP SERPL CALC-SCNC: 6 MMOL/L (ref 0–18)
AST SERPL-CCNC: 17 U/L (ref ?–34)
BILIRUB SERPL-MCNC: 0.8 MG/DL (ref 0.3–1.2)
BUN BLD-MCNC: 20 MG/DL (ref 9–23)
BUN/CREAT SERPL: 26.3 (ref 10–20)
CALCIUM BLD-MCNC: 9.5 MG/DL (ref 8.7–10.4)
CHLORIDE SERPL-SCNC: 107 MMOL/L (ref 98–112)
CHOLEST SERPL-MCNC: 182 MG/DL (ref ?–200)
CO2 SERPL-SCNC: 29 MMOL/L (ref 21–32)
CREAT BLD-MCNC: 0.76 MG/DL
DEPRECATED HBV CORE AB SER IA-ACNC: 38.2 NG/ML
DEPRECATED RDW RBC AUTO: 44.9 FL (ref 35.1–46.3)
EGFRCR SERPLBLD CKD-EPI 2021: 101 ML/MIN/1.73M2 (ref 60–?)
ERYTHROCYTE [DISTWIDTH] IN BLOOD BY AUTOMATED COUNT: 13.1 % (ref 11–15)
EST. AVERAGE GLUCOSE BLD GHB EST-MCNC: 111 MG/DL (ref 68–126)
FASTING PATIENT LIPID ANSWER: YES
FASTING STATUS PATIENT QL REPORTED: YES
FSH SERPL-ACNC: 5 MIU/ML
GLOBULIN PLAS-MCNC: 2.9 G/DL (ref 2–3.5)
GLUCOSE BLD-MCNC: 81 MG/DL (ref 70–99)
HBA1C MFR BLD: 5.5 % (ref ?–5.7)
HCT VFR BLD AUTO: 36.7 %
HDLC SERPL-MCNC: 64 MG/DL (ref 40–59)
HGB BLD-MCNC: 12.1 G/DL
LDLC SERPL CALC-MCNC: 102 MG/DL (ref ?–100)
MCH RBC QN AUTO: 30.6 PG (ref 26–34)
MCHC RBC AUTO-ENTMCNC: 33 G/DL (ref 31–37)
MCV RBC AUTO: 92.7 FL
NONHDLC SERPL-MCNC: 118 MG/DL (ref ?–130)
OSMOLALITY SERPL CALC.SUM OF ELEC: 296 MOSM/KG (ref 275–295)
PLATELET # BLD AUTO: 243 10(3)UL (ref 150–450)
POTASSIUM SERPL-SCNC: 4.3 MMOL/L (ref 3.5–5.1)
PROT SERPL-MCNC: 7.6 G/DL (ref 5.7–8.2)
RBC # BLD AUTO: 3.96 X10(6)UL
SODIUM SERPL-SCNC: 142 MMOL/L (ref 136–145)
TRIGL SERPL-MCNC: 86 MG/DL (ref 30–149)
TSI SER-ACNC: 1.5 MIU/ML (ref 0.55–4.78)
VIT D+METAB SERPL-MCNC: 29.6 NG/ML (ref 30–100)
VLDLC SERPL CALC-MCNC: 14 MG/DL (ref 0–30)
WBC # BLD AUTO: 6.7 X10(3) UL (ref 4–11)

## 2024-08-23 PROCEDURE — 85027 COMPLETE CBC AUTOMATED: CPT

## 2024-08-23 PROCEDURE — 99396 PREV VISIT EST AGE 40-64: CPT | Performed by: STUDENT IN AN ORGANIZED HEALTH CARE EDUCATION/TRAINING PROGRAM

## 2024-08-23 PROCEDURE — 82728 ASSAY OF FERRITIN: CPT

## 2024-08-23 PROCEDURE — 82306 VITAMIN D 25 HYDROXY: CPT

## 2024-08-23 PROCEDURE — 80053 COMPREHEN METABOLIC PANEL: CPT

## 2024-08-23 PROCEDURE — 3008F BODY MASS INDEX DOCD: CPT | Performed by: STUDENT IN AN ORGANIZED HEALTH CARE EDUCATION/TRAINING PROGRAM

## 2024-08-23 PROCEDURE — 83520 IMMUNOASSAY QUANT NOS NONAB: CPT

## 2024-08-23 PROCEDURE — 3074F SYST BP LT 130 MM HG: CPT | Performed by: STUDENT IN AN ORGANIZED HEALTH CARE EDUCATION/TRAINING PROGRAM

## 2024-08-23 PROCEDURE — 83036 HEMOGLOBIN GLYCOSYLATED A1C: CPT

## 2024-08-23 PROCEDURE — 80061 LIPID PANEL: CPT

## 2024-08-23 PROCEDURE — 83001 ASSAY OF GONADOTROPIN (FSH): CPT

## 2024-08-23 PROCEDURE — 84443 ASSAY THYROID STIM HORMONE: CPT

## 2024-08-23 PROCEDURE — 36415 COLL VENOUS BLD VENIPUNCTURE: CPT

## 2024-08-23 PROCEDURE — 3078F DIAST BP <80 MM HG: CPT | Performed by: STUDENT IN AN ORGANIZED HEALTH CARE EDUCATION/TRAINING PROGRAM

## 2024-08-23 NOTE — PROGRESS NOTES
HPI:    Patient ID: Fifi Garcia is a 39 year old female.    HPI  Pt presenting for routine physical exam. Denies any acute issues or recent illnesses. No significant chronic medical problems. Past medical/surgical history, family history, and social history were reviewed.     H/o fatigue  H/o depressed mood  Prior Zoloft, Adderall with side effects -- notes fog, increased irritability  Continues to have regular poor energy and low motivation  Endorses recent menses changes over the last 5 months  Notes regular menses but heavier flow  Notes new cramping compared to prior    FHx autoimmune disease  Taking MVI  Denies SH/SI/HI    Review of Systems   A comprehensive 10 point review of systems was completed.  Pertinent positives and negatives noted in the the HPI.       Current Outpatient Medications   Medication Sig Dispense Refill    magnesium oxide 400 MG Oral Tab Take 1 tablet (400 mg total) by mouth daily.      ergocalciferol 1.25 MG (77821 UT) Oral Cap Take 1 capsule (50,000 Units total) by mouth once a week. 24 capsule 0    Ferrous Sulfate 325 (65 Fe) MG Oral Tab Take 1 tablet (325 mg total) by mouth daily with breakfast. 90 tablet 3     Allergies:No Known Allergies   Vitals:    08/23/24 0920   BP: (!) 86/56   Pulse: 81   Resp: 18   Temp: 97.2 °F (36.2 °C)   TempSrc: Temporal   SpO2: 99%   Weight: 106 lb (48.1 kg)   Height: 5' 3\" (1.6 m)       Body mass index is 18.78 kg/m².   PHYSICAL EXAM:   Physical Exam  Vitals reviewed.   Constitutional:       General: She is not in acute distress.     Appearance: Normal appearance. She is well-developed.   HENT:      Head: Normocephalic and atraumatic.      Right Ear: Tympanic membrane, ear canal and external ear normal.      Left Ear: Tympanic membrane, ear canal and external ear normal.   Eyes:      Conjunctiva/sclera: Conjunctivae normal.   Neck:      Thyroid: No thyroid mass or thyroid tenderness.   Cardiovascular:      Rate and Rhythm: Normal rate and regular  rhythm.      Pulses: Normal pulses.      Heart sounds: Normal heart sounds, S1 normal and S2 normal. No murmur heard.  Pulmonary:      Effort: Pulmonary effort is normal. No respiratory distress.      Breath sounds: Normal breath sounds. No wheezing, rhonchi or rales.   Abdominal:      General: Bowel sounds are normal.      Palpations: Abdomen is soft.      Tenderness: There is no abdominal tenderness. There is no guarding or rebound.   Genitourinary:     General: Normal vulva.      Exam position: Lithotomy position.      Vagina: Normal.      Cervix: Normal.      Uterus: Not tender.       Adnexa:         Right: No tenderness.          Left: No tenderness.     Musculoskeletal:      Cervical back: Normal range of motion and neck supple. No muscular tenderness.      Right lower leg: No edema.      Left lower leg: No edema.   Lymphadenopathy:      Cervical: No cervical adenopathy.   Skin:     General: Skin is warm and dry.      Coloration: Skin is not jaundiced.   Neurological:      General: No focal deficit present.      Mental Status: She is alert and oriented to person, place, and time. Mental status is at baseline.   Psychiatric:         Attention and Perception: Attention normal.         Mood and Affect: Mood normal.         Behavior: Behavior normal. Behavior is cooperative.         Cognition and Memory: Cognition normal.             ASSESSMENT/PLAN:   1. Well adult exam  Discussed preventative screenings  - will check Pap/HPV testing today  - Pap 12/2023  - will check labs as below  - encouraged to continue diet/exercise for overall wellness  - follow-up with eye doctor annually  - follow-up with dentist every 6 months  - return yearly for physicals  - annual flu shot  - tetanus booster every 10yrs  - TSH W Reflex To Free T4; Future  - CBC, Platelet; No Differential; Future  - Comp Metabolic Panel (14); Future  - Hemoglobin A1C; Future  - Lipid Panel; Future    2. Screening for cervical cancer  - ThinPrep PAP  Smear; Future  - Hpv Dna  High Risk , Thin Prep Collect; Future  - Hpv Dna  High Risk , Thin Prep Collect  - ThinPrep PAP Smear  - THINPREP PAP SMEAR ONLY    3. Fatigue, unspecified type  Discussed DDx  Will check labs  - Vitamin D; Future  - Ferritin; Future  - Anti-Mullerian Hormone; Future  - FSH [E]; Future    Pt verbalized understanding and agrees with plan.      Orders Placed This Encounter   Procedures    TSH W Reflex To Free T4    CBC, Platelet; No Differential    Comp Metabolic Panel (14)    Hemoglobin A1C    Lipid Panel    Vitamin D    Ferritin    Hpv Dna  High Risk , Thin Prep Collect    Anti-Mullerian Hormone    FSH [E]    ThinPrep PAP Smear    THINPREP PAP SMEAR ONLY       Meds This Visit:  Requested Prescriptions      No prescriptions requested or ordered in this encounter       Imaging & Referrals:  None         ID#[id number]

## 2024-08-24 DIAGNOSIS — R79.0 LOW FERRITIN: ICD-10-CM

## 2024-08-24 DIAGNOSIS — R53.83 FATIGUE, UNSPECIFIED TYPE: ICD-10-CM

## 2024-08-24 DIAGNOSIS — E55.9 VITAMIN D DEFICIENCY: Primary | ICD-10-CM

## 2024-08-24 RX ORDER — FERROUS SULFATE 325(65) MG
325 TABLET ORAL
Qty: 90 TABLET | Refills: 3 | Status: SHIPPED | OUTPATIENT
Start: 2024-08-24

## 2024-08-24 RX ORDER — ERGOCALCIFEROL 1.25 MG/1
50000 CAPSULE, LIQUID FILLED ORAL WEEKLY
Qty: 24 CAPSULE | Refills: 0 | Status: SHIPPED | OUTPATIENT
Start: 2024-08-24

## 2024-08-26 ENCOUNTER — LAB ENCOUNTER (OUTPATIENT)
Dept: LAB | Facility: HOSPITAL | Age: 39
End: 2024-08-26
Attending: INTERNAL MEDICINE
Payer: COMMERCIAL

## 2024-08-26 ENCOUNTER — OFFICE VISIT (OUTPATIENT)
Dept: RHEUMATOLOGY | Facility: CLINIC | Age: 39
End: 2024-08-26
Payer: COMMERCIAL

## 2024-08-26 VITALS
DIASTOLIC BLOOD PRESSURE: 73 MMHG | SYSTOLIC BLOOD PRESSURE: 109 MMHG | WEIGHT: 106 LBS | BODY MASS INDEX: 18.78 KG/M2 | HEART RATE: 72 BPM | HEIGHT: 63 IN

## 2024-08-26 DIAGNOSIS — R53.83 FATIGUE, UNSPECIFIED TYPE: ICD-10-CM

## 2024-08-26 DIAGNOSIS — M54.2 NECK PAIN: ICD-10-CM

## 2024-08-26 DIAGNOSIS — R53.83 OTHER FATIGUE: Primary | ICD-10-CM

## 2024-08-26 DIAGNOSIS — R53.83 OTHER FATIGUE: ICD-10-CM

## 2024-08-26 LAB
CCP IGG SERPL-ACNC: 1.2 U/ML (ref 0–6.9)
ENA SS-A IGG SER IA-ACNC: 0.5 U/ML
ENA SS-B IGG SER IA-ACNC: 0.5 U/ML
HPV E6+E7 MRNA CVX QL NAA+PROBE: NEGATIVE
MULLERIAN AMH: 3.05 NG/ML
RHEUMATOID FACT SERPL-ACNC: <3.5 IU/ML (ref ?–14)
VIT B12 SERPL-MCNC: 788 PG/ML (ref 211–911)

## 2024-08-26 PROCEDURE — 86235 NUCLEAR ANTIGEN ANTIBODY: CPT

## 2024-08-26 PROCEDURE — 82607 VITAMIN B-12: CPT

## 2024-08-26 PROCEDURE — 86431 RHEUMATOID FACTOR QUANT: CPT | Performed by: INTERNAL MEDICINE

## 2024-08-26 PROCEDURE — 36415 COLL VENOUS BLD VENIPUNCTURE: CPT | Performed by: INTERNAL MEDICINE

## 2024-08-26 PROCEDURE — 86200 CCP ANTIBODY: CPT | Performed by: INTERNAL MEDICINE

## 2024-08-26 RX ORDER — MAGNESIUM OXIDE 400 MG/1
400 TABLET ORAL DAILY
COMMUNITY

## 2024-08-26 NOTE — PROGRESS NOTES
Fifi Garcia is a 39 year old female who presents for   Chief Complaint   Patient presents with    Consult    Fatigue    Stiffness     Back    .   HPI:   CC: joint pain, back pain, fatigue   Consult: referred by PCP Dr. Sánchez     This is a 40 yo F with hx of Anxiety, DVT (during pregnancy) referred for chronic fatigue and joint pain.  She reports chronic pain in her upper back and shoulders region.  She states that her muscles feel very tense.  She also throughout her lower back 2 weeks ago and could hardly move.  Symptoms have improved.  She did this by lifting a table.  Her mom has RA and Sjogren's and is concerned if she has any autoimmune disease.  Denies any sicca symptoms, oral ulcers, lymphadenopathy, hair loss, serositis, DVT or PE, miscarriages or RP.  No rashes or psoriasis.  She is active with her kids, plays baseball and rides bikes.  She has 4 kids ages 8, 6-year-old twins and a 4-year-old.  They are all boys.  She does report a lot of fatigue and brain fog.    Blood work:  Neg DC, ESR, CRP, RF (2019 and 2022)        Wt Readings from Last 2 Encounters:   08/26/24 106 lb (48.1 kg)   08/23/24 106 lb (48.1 kg)     Body mass index is 18.78 kg/m².      Current Outpatient Medications   Medication Sig Dispense Refill    magnesium oxide 400 MG Oral Tab Take 1 tablet (400 mg total) by mouth daily.      ergocalciferol 1.25 MG (65986 UT) Oral Cap Take 1 capsule (50,000 Units total) by mouth once a week. 24 capsule 0    Ferrous Sulfate 325 (65 Fe) MG Oral Tab Take 1 tablet (325 mg total) by mouth daily with breakfast. 90 tablet 3    valACYclovir (VALTREX) 500 MG Oral Tab Take 1 tablet (500 mg total) by mouth daily as needed. (Patient not taking: Reported on 8/23/2024) 90 tablet 0    LORAZEPAM 1 MG Oral Tab Take 1 tablet (1 mg total) by mouth 2 (two) times daily as needed for Anxiety. (Patient not taking: Reported on 9/20/2023) 8 tablet 0    sertraline 50 MG Oral Tab TAKE 1 AND 1/2 TABLETS(75 MG) BY MOUTH  DAILY. INCREASE  MG 5 DAYS BEFORE ONSET OF MENSES, THROUGH DAY 5 OF MENSES (Patient not taking: Reported on 8/23/2024) 135 tablet 0      Past Medical History:    Anxiety    Menses painful    Miscarriage within last 12 months    Vitamin D deficiency      No past surgical history on file.   Family History   Problem Relation Age of Onset    Arthritis Mother         Sjorgrens and RA    Other (Other) Mother         hypothyroid    Hypertension Father     Other (Other) Sister         hypothyroid      Social History:  Social History     Socioeconomic History    Marital status:    Tobacco Use    Smoking status: Never    Smokeless tobacco: Never   Vaping Use    Vaping status: Never Used   Substance and Sexual Activity    Alcohol use: Yes     Alcohol/week: 1.0 standard drink of alcohol     Types: 1 Glasses of wine per week     Comment: socially    Drug use: No    Sexual activity: Yes     Partners: Male     Comment: no OCP or protection           REVIEW OF SYSTEMS:   Review Of Systems:  Constitutional: No fever, no change in weight or appetitie  Derm: No rashes, no oral ulcers, no alopecia, no photosensitivity, no psoriasis  HEENT: No dry eyes, no dry mouth, no Raynaud's, no nasal ulcers, no parotid swelling, no neck pain, no jaw pain, no temple pain  Eyes: No visual changes,   CVS: No chest pain, no heart disease  RS: No SOB, no Cough, No Pleurtic pain,   GI: No nausea, no vomiiting, no abominal pain, no hx of ulcer, no gastritis, no heartburn, no dyshpagia, no BRBPR or melena  : no dysuria, no hx of miscarriages, no DVT Hx, no hx of OCP,   Neuro: No numbness or tingling, no headache, no hx of seizures,   Psych: no hx of anxiety or depression  ENDO: no hx of thyroid disease, no hx of DM  Joint/Muscluskeltal: see HPI,   All other ROS are negative.     EXAM:   /73 (BP Location: Left arm, Patient Position: Sitting, Cuff Size: adult)   Pulse 72   Ht 5' 3\" (1.6 m)   Wt 106 lb (48.1 kg)   LMP 08/15/2024  (Approximate)   BMI 18.78 kg/m²   GEN: AAOx3, NAD  HEENT: EOMI, PERRLA, no injection or icterus, oral mucosa moist, no oral lesions. No lymphadenopathy. No facial rash  CVS: RRR, no murmurs rubs or gallops. Equal 2+ distal pulses.   LUNGS: CTAB, no increased work of breathing  ABDOMEN:  soft NT/ND, +BS, no HSM  SKIN: No rashes or skin lesions. No nail findings  MSK:  Cervical spine: FROM  Hands: no synovitis in DIP, PIP and MCP, strong full fists  Wrist: FROM, no pain or swelling or warmth on palpation  Elbow: FROM, no pain or swelling or warmth on palpation  Shoulders: FROM, no pain or swelling or warmth on palpation  Hip: normal log roll, no lateral hip pain, MARCOS test negative b/l  Knees: FROM, no warmth or effusion present. No pain with ROM.   Ankles: FROM, no pain or swelling or warmth on palpation  Feet: no pain with MTP squeeze, no toe swelling or pain or warmth on palpation with FROM  Spine: no lumbar or sacral pain on palpation.  NEURO: Cranial nerves II-XII intact grossly. 5/5 strength throughout in both upper and lower extremities, sensation intact.  PSYCH: normal mood    LABS:     Component      Latest Ref Rng 11/21/2016 4/12/2019   DC SCREEN      Negative  Negative  Negative    RHEUMATOID FACTOR      <15 IU/mL <10     SED RATE      0 - 25 mm/hr 14     C-Reactive Protein      <=1.00 mg/dL 0.09  0.10          IMAGING:     XR R hand 2023: normal    ASSESSMENT AND PLAN:     39-year-old female presenting with upper back pain and stiffness, lower back pain, fatigue and brain fog.  Her mom has RA and Sjogren's.    - She had blood work in the past showing a negative DC, ESR, CRP and RF  - She has no swelling or synovitis on exam.  No history of psoriasis  - Plan to obtain further blood work but suspicion is low for an autoimmune process    Thank you for allowing me to participate in this patients care. Pt will be notified of results and if follow-up is needed    Kia Landaverde MD  8/26/2024  11:08 AM

## 2024-08-26 NOTE — PATIENT INSTRUCTIONS
You were seen today for some upper back stiffness, lower back pain, fatigue  Mom has Sjogren's and rheumatoid arthritis  Lets evaluate to see if anything autoimmune is going on but my suspicion is low  Blood work today

## 2024-11-06 ENCOUNTER — HOSPITAL ENCOUNTER (OUTPATIENT)
Dept: ULTRASOUND IMAGING | Age: 39
Discharge: HOME | End: 2024-11-06
Attending: STUDENT IN AN ORGANIZED HEALTH CARE EDUCATION/TRAINING PROGRAM
Payer: COMMERCIAL

## 2024-11-06 DIAGNOSIS — E34.9: ICD-10-CM

## 2024-11-06 PROCEDURE — 76830 TRANSVAGINAL US NON-OB: CPT | Performed by: STUDENT IN AN ORGANIZED HEALTH CARE EDUCATION/TRAINING PROGRAM

## 2024-11-06 PROCEDURE — 76856 US EXAM PELVIC COMPLETE: CPT | Performed by: STUDENT IN AN ORGANIZED HEALTH CARE EDUCATION/TRAINING PROGRAM

## 2024-11-26 ENCOUNTER — PATIENT MESSAGE (OUTPATIENT)
Dept: FAMILY MEDICINE CLINIC | Facility: CLINIC | Age: 39
End: 2024-11-26

## 2024-11-26 DIAGNOSIS — B00.2 ORAL HERPES: ICD-10-CM

## 2024-11-27 RX ORDER — VALACYCLOVIR HYDROCHLORIDE 500 MG/1
500 TABLET, FILM COATED ORAL DAILY
Qty: 30 TABLET | Refills: 2 | Status: SHIPPED | OUTPATIENT
Start: 2024-11-27

## 2024-11-27 NOTE — TELEPHONE ENCOUNTER
Pended refill request and sent to refill folder to review and process accordingly.   Patient has had this before.     Ideal to be started within certain timeframe to be most effective, so sending high Priority.

## 2024-11-27 NOTE — TELEPHONE ENCOUNTER
Refill passed per [facility] protocols.    Requested Prescriptions   Pending Prescriptions Disp Refills    valACYclovir (VALTREX) 500 MG Oral Tab 30 tablet 2     Sig: Take 1 tablet (500 mg total) by mouth daily.       Herpes Agent Protocol Passed - 11/27/2024 12:53 PM        Passed - In person appointment or virtual visit in the past 12 mos or appointment in next 3 mos     Recent Outpatient Visits              3 months ago Other fatigue    [facility], Kia Sam MD    Office Visit    3 months ago Well adult [facility]    [facility]    11 months ago [facility]al [facility] right [facility]    1 year [facility]    1 year ago [facility] changes    [facility]    [facility]

## 2025-01-07 DIAGNOSIS — F41.8 DEPRESSION WITH ANXIETY: ICD-10-CM

## 2025-01-10 RX ORDER — BUPROPION HYDROCHLORIDE 150 MG/1
150 TABLET ORAL DAILY
Qty: 90 TABLET | Refills: 1 | Status: SHIPPED | OUTPATIENT
Start: 2025-01-10

## 2025-01-10 NOTE — TELEPHONE ENCOUNTER
Refill passed per [facility] protocol.    Last office visit = 12/16/24   Last refill = #30  zero refills    Assessment  ASSESSMENT/PLAN:   1. Depression with anxiety  Discussed DDx  Adderall, Zoloft with side effects  Taking iron, vit D supplements  Will trial Bupropion dosing  - discussed red flags for urgent reevaluation  - to call with any questions/concerns  - buPROPion  MG Oral Tablet 24 Hr; Take 1 tablet (150 mg total) by mouth daily.  Dispense: 30 tablet; Refill: 0         Requested Prescriptions   Pending Prescriptions Disp Refills    buPROPion  MG Oral Tablet 24 Hr 30 tablet 0     Sig: Take 1 tablet (150 mg total) by mouth daily.       Psychiatric Non-Scheduled (Anti-Anxiety) Passed - 1/10/2025  1:44 PM        Passed - In person appointment or virtual visit in the past 6 mos or appointment in next 3 mos     Recent Outpatient Visits              3 weeks ago Depression with anxiety    [facility], Analy Coronel MD    Telemedicine    4 months ago Other fatigue    [facility], Kia Sam MD    Office Visit    4 months ago Well adult exam    [facility], Analy Coronel MD    Office Visit    1 year ago Metacarpophalangeal joint pain of right hand    [facility], Analy Coronel MD    Telemedicine    1 year ago Memory problem    [facility], Maritza Sam MD    Office Visit          Future Appointments         Provider Department Appt Notes    In 3 months Naima Powers PA-C [facility]                    Passed - Depression Screening completed within the past 12 months        Passed - Medication is active on med list           Future Appointments         Provider Department Appt Notes    In 3  months Naima Powers PA-C [facility], [facility], [place] fatigue          Recent Outpatient Visits              3 weeks ago Depression with anxiety    [facility], [facility], Analy Coronel MD    Telemedicine    4 months ago Other fatigue    [facility], Kia Sam MD    Office Visit    4 months ago Well adult exam    [facility], [facility], Analy Coronel MD    Office Visit    1 year ago Metacarpophalangeal joint pain of right hand    [facility], [facility], Analy Coronel MD    Telemedicine    1 year ago Memory problem    [facility], Maritza Sam MD    Office Visit

## 2025-02-25 ENCOUNTER — HOSPITAL ENCOUNTER (OUTPATIENT)
Age: 40
Discharge: HOME | End: 2025-02-25
Payer: COMMERCIAL

## 2025-02-25 VITALS
RESPIRATION RATE: 18 BRPM | DIASTOLIC BLOOD PRESSURE: 70 MMHG | TEMPERATURE: 102 F | OXYGEN SATURATION: 99 % | SYSTOLIC BLOOD PRESSURE: 106 MMHG | HEART RATE: 104 BPM

## 2025-02-25 DIAGNOSIS — J11.1 INFLUENZA: Primary | ICD-10-CM

## 2025-02-25 DIAGNOSIS — R50.9 FEVER, UNSPECIFIED FEVER CAUSE: ICD-10-CM

## 2025-02-25 LAB
POCT INFLUENZA A: NEGATIVE
POCT INFLUENZA B: POSITIVE

## 2025-02-25 PROCEDURE — 87502 INFLUENZA DNA AMP PROBE: CPT

## 2025-02-25 PROCEDURE — 99213 OFFICE O/P EST LOW 20 MIN: CPT

## 2025-02-25 RX ORDER — BENZONATATE 100 MG/1
100 CAPSULE ORAL 3 TIMES DAILY PRN
Qty: 30 CAPSULE | Refills: 0 | Status: SHIPPED | OUTPATIENT
Start: 2025-02-25 | End: 2025-03-27

## 2025-02-25 RX ORDER — ACETAMINOPHEN 325 MG/1
650 TABLET ORAL ONCE
Status: COMPLETED | OUTPATIENT
Start: 2025-02-25 | End: 2025-02-25

## 2025-02-26 NOTE — ED PROVIDER NOTES
Patient Seen in: [facility]      History     Chief Complaint   Patient presents with    Cold     Entered by patient     Stated Complaint: cough, body ache, sore throat  Subjective:   Georgia is a 39-year-old female presenting to the immediate care complaining of fever, body aches, chills, cough, congestion, rhinorrhea, postnasal drip and fatigue for the past 4 days.  Patient states that she has not had any episodes of respiratory distress or severe difficulty breathing.  Patient denies any chest pain, shortness of breath, abdominal pain or vomiting.  She has been eating and drinking well despite a decreased appetite.  Patient has been drinking a lot of fluids.  Urinating normally.  Patient denies any other concerns or complaints.        Objective:   Past Medical History:    Anxiety    Menses painful    Miscarriage within last 12 months    Vitamin D deficiency            History reviewed. No pertinent surgical history.           Social History     Socioeconomic History    Marital status:    Tobacco Use    Smoking status: Never    Smokeless tobacco: Never   Vaping Use    Vaping status: Never Used   Substance and Sexual Activity    Alcohol use: Yes     Alcohol/week: 1.0 standard drink of alcohol     Types: 1 Glasses of wine per week     Comment: socially    Drug use: No    Sexual activity: Yes     Partners: Male     Comment: no OCP or protection            Review of Systems    Positive for stated complaint: Cold (Entered by patient)    Other systems are as noted in HPI.  Constitutional and vital signs reviewed.      All other systems reviewed and negative except as noted above.    Physical Exam     ED Triage Vitals [02/25/25 1756]   /70   Pulse 110   Resp 18   Temp (!) 101.4 °F (38.6 °C)   Temp src Oral   SpO2 98 %   O2 Device None (Room air)     Current:/70   Pulse 104   Temp (!) 101.6 °F (38.7 °C) (Oral)   Resp 18   LMP 02/21/2025 (Approximate)   SpO2 99%     Physical  Exam  Vitals and nursing note reviewed.   Constitutional:       General: She is not in acute distress.     Appearance: Normal appearance. She is not ill-appearing, toxic-appearing or diaphoretic.   HENT:      Head: Normocephalic.      Right Ear: Tympanic membrane, ear canal and external ear normal.      Left Ear: Tympanic membrane, ear canal and external ear normal.      Nose: Congestion present.      Mouth/Throat:      Mouth: Mucous membranes are moist.      Pharynx: Oropharynx is clear. Uvula midline. No pharyngeal swelling, oropharyngeal exudate, posterior oropharyngeal erythema, uvula swelling or postnasal drip.      Tonsils: No tonsillar exudate or tonsillar abscesses. 0 on the right. 0 on the left.      Comments: No trismus  Eyes:      Conjunctiva/sclera: Conjunctivae normal.   Cardiovascular:      Rate and Rhythm: Normal rate and regular rhythm.      Pulses: Normal pulses.      Heart sounds: Normal heart sounds.   Pulmonary:      Effort: Pulmonary effort is normal. No tachypnea, bradypnea, accessory muscle usage, prolonged expiration, respiratory distress or retractions.      Breath sounds: Normal breath sounds and air entry. No stridor, decreased air movement or transmitted upper airway sounds. No decreased breath sounds, wheezing, rhonchi or rales.   Abdominal:      General: Abdomen is flat.   Musculoskeletal:         General: Normal range of motion.      Cervical back: Normal range of motion.   Skin:     General: Skin is warm.      Capillary Refill: Capillary refill takes less than 2 seconds.   Neurological:      General: No focal deficit present.      Mental Status: She is alert and oriented to person, place, and time.   Psychiatric:         Mood and Affect: Mood normal.         Behavior: Behavior normal.         Thought Content: Thought content normal.         Judgment: Judgment normal.         ED Course   Radiology:    No orders to display     Labs Reviewed   POCT FLU TEST - Abnormal; Notable for the  following components:       Result Value    POCT INFLUENZA B Positive (*)     All other components within normal limits    Narrative:     This assay is a rapid molecular in vitro test utilizing nucleic acid amplification of influenza A and B viral RNA.       MDM     Medical Decision Making  Differential diagnoses reflecting the complexity of care include but are not limited to viral versus bacterial etiology of upper respiratory complaints.    Comorbidities that add complexity to management include: None  History obtained by an independent source was from: Patient  Patient is well appearing, non-toxic and in no acute distress.  Vital signs are stable.     Influenza B test is positive.  There are no signs of infection on physical exam.  History and physical exam are consistent with viral illness.  Patient arrived with fever and mild tachycardia.  There was some improvement in tachycardia following p.o. fluids and Tylenol.  Patient wishes to go home and continue Tylenol and ibuprofen at home for fever.  Patient is reliable to treat fever at home.    I sent a prescription for Tessalon Perles for the cough.  Recommended that patient drink plenty of fluids, use Tylenol and Motrin for pain or fever, use Flonase for nasal congestion and may use over-the-counter medications for congestion as needed.  Recommended that if the patient develops any chest pain, respiratory complaints, fever that does not improve with medications or any other concerning complaints they should go to the emergency department.  ED precautions discussed.  Patient (guardian) advised to follow up with PCP in 2-3 days.  Patient (guardian) agrees with this plan of care.  Patient (guardian) verbalizes understanding of discharge instructions and plan of care.      Amount and/or Complexity of Data Reviewed  Labs: ordered. Decision-making details documented in ED Course.    Risk  OTC drugs.  Prescription drug management.        Disposition and Plan      Clinical Impression:  1. Influenza    2. Fever, unspecified fever cause         Disposition:  Discharge  2/25/2025  6:39 pm    Follow-up:  Analy Sánchez MD  [address] [place], [place] [id number] [phone]                Medications Prescribed:  Discharge Medication List as of 2/25/2025  6:44 PM        START taking these medications    Details   benzonatate 100 MG Oral Cap Take 1 capsule (100 mg total) by mouth 3 (three) times daily as needed for cough., Normal, Disp-30 capsule, R-0

## 2025-02-26 NOTE — DISCHARGE INSTRUCTIONS
Influenza A test is positive.  There are no signs of infection on physical exam.  This is a viral illness; interval illnesses do not necessitate antibiotics.  I sent you a prescription for Tessalon Perles for your cough.  Please be sure to drink plenty of fluids, use Tylenol and Motrin for pain or fever.  Alternate tylenol and motrin - each medication can be given every 8 hours and should be alternated.  For example, if you give Ibuprofen at 12 pm, then tylenol would be given at 4 pm then ibuprofen at 8 pm and tylenol again at 12 am.    Use Flonase and Mucinex for congestion.  If you develop any respiratory complaints, fever that does not improve with medications or any other concerning complaints you should go to the emergency department. Otherwise follow up with your primary care provider.

## 2025-04-16 ENCOUNTER — OFFICE VISIT (OUTPATIENT)
Dept: INTEGRATIVE MEDICINE | Facility: CLINIC | Age: 40
End: 2025-04-16
Payer: COMMERCIAL

## 2025-04-16 VITALS
OXYGEN SATURATION: 99 % | HEART RATE: 81 BPM | DIASTOLIC BLOOD PRESSURE: 68 MMHG | HEIGHT: 63 IN | WEIGHT: 107.81 LBS | SYSTOLIC BLOOD PRESSURE: 98 MMHG | BODY MASS INDEX: 19.1 KG/M2

## 2025-04-16 DIAGNOSIS — D64.9 ANEMIA, UNSPECIFIED TYPE: ICD-10-CM

## 2025-04-16 DIAGNOSIS — R53.82 CHRONIC FATIGUE: Primary | ICD-10-CM

## 2025-04-16 DIAGNOSIS — E55.9 VITAMIN D DEFICIENCY: ICD-10-CM

## 2025-04-16 DIAGNOSIS — E53.8 LOW SERUM VITAMIN B12: ICD-10-CM

## 2025-04-16 DIAGNOSIS — E61.7 DEFICIENCY OF MULTIPLE NUTRIENT ELEMENTS: ICD-10-CM

## 2025-04-16 DIAGNOSIS — F41.9 ANXIETY: ICD-10-CM

## 2025-04-16 DIAGNOSIS — N94.3 PMS (PREMENSTRUAL SYNDROME): ICD-10-CM

## 2025-04-16 DIAGNOSIS — E34.8 ENDOCRINE AXIS DYSFUNCTION: ICD-10-CM

## 2025-04-16 DIAGNOSIS — R42 POSITIONAL LIGHTHEADEDNESS: ICD-10-CM

## 2025-04-16 PROCEDURE — 3074F SYST BP LT 130 MM HG: CPT | Performed by: PHYSICIAN ASSISTANT

## 2025-04-16 PROCEDURE — 99215 OFFICE O/P EST HI 40 MIN: CPT | Performed by: PHYSICIAN ASSISTANT

## 2025-04-16 PROCEDURE — G2211 COMPLEX E/M VISIT ADD ON: HCPCS | Performed by: PHYSICIAN ASSISTANT

## 2025-04-16 PROCEDURE — 3008F BODY MASS INDEX DOCD: CPT | Performed by: PHYSICIAN ASSISTANT

## 2025-04-16 PROCEDURE — 3078F DIAST BP <80 MM HG: CPT | Performed by: PHYSICIAN ASSISTANT

## 2025-04-16 NOTE — PATIENT INSTRUCTIONS
Raspberry Leaf Tea to help with menstrual cramps   2.   Motherwort by [employer]        Shake Well Before Using    Take up to 40 drops in 2oz. of water, two to four times per day. Best taken between meals.     3. Nuun (Available at [employer], [employer])   Or:  Catalyte Lemon Lime NSF by Austin Gonzalez helps you restore electrolyte balance.* The electrolyte mix is designed to replenish the micronutrients lost through sweat during exercise and athletic performance. Studies suggest that an amino acid-electrolyte formula can better increase cellular rehydration compared to formulas without amino acids.* The blend of electrolytes and micronutrients includes taurine, a conditionally essential amino acid that supports rehydration at the cellular level.*   Catalyte is low in calories and is perfect before, during, and after a workout. Simply mix one scoop with water.  Benefits:    Balanced electrolytes helps fight fatigue, promotes calorie control, and helps fuel your muscles and brain.*    Potassium promotes fast recovery and helps reduce cramps.*    Replenishes important minerals in the body that are lost during high intensity activity. Taurine has been shown to promote hydration at the cellular level, which supports performance and recovery.     4. Vitex by Pure Encapsulations         Menstrual Cycle Support: Chaste tree has been associated with supporting breast comfort and positive mood during the menstrual cycle, as suggested by numerous randomized, double blind, placebo-controlled trials. It has also been associated with promoting healthy menstrual luteal phase length and menstrual regularity for some women, helping to maintain healthy reproductive system function.*    Directions: Take 1 tablet twice per day for 3 months       5. Get labs drawn in the morning.  Please check your next 2.'s and then send us a message with how many days in between each period, for example 28 days or 30 days from Day One of your  period to Day One of the next period.   ---------------------------------------------------------------------------------  Where to Purchase: [email or web address]  This is our [facility] online dispensary for vitamins and supplements where you receive a 10% discount off of supplement prices! Select the \"Log In\" and then use your email address to complete creating your personal account. Please call [employer] at [phone], if you need direct help.     The products and items listed below (the “Products”)  and their claims have not been evaluated by the Food and Drug Administration. Dietary products are not intended to treat, prevent, mitigate or cure disease. Ultimately, you must draw your own conclusion as to the efficacy of the Product and immediately stop use of the Products if a negative reaction should arise.  You should always consult a licensed health care professional before starting any supplement, dietary, or exercise program, especially if you are pregnant or have any pre-existing injuries or medical conditions. The patient agrees that the [facility] and its affiliates and its [facility] (“[facility]”) are not liable for the patients use of the Products. [facility] makes no representations or warranties of any kind, expressed or implied, as to the Products, including, but not limited to its efficacy, benefits or outcomes.  Patient agrees to contact his/her healthcare professional and stop use of Products should any reactions arise

## 2025-04-16 NOTE — PROGRESS NOTES
Fifi Garcia is a 39 year old female.  Chief Complaint   Patient presents with    Establish Care     Fatigue, sleep, and brain fog        HPI:   39 yr old female new patient presents today for an initial evaluation regarding the above.     Extreme fatigue, more anxious, brain fog, more orosco premenstrually over the last 2years  Difficulty sleeping - wakes frequently and hard to get back to sleep (slightly better in last few weeks)     -Has tried Ashwagandha, Rhodiola and magnesium and were not helpful in decreasing the anxiety or helping with sleep    Hair thinning and not growing as much   Runs cold     Slightly light headed when standing    Started Wellbutrin and fatigue and anxiety improved somewhat    Low iron for some time- supplements    GI: Daily BM     Gyn: Heavier menses, more cramping   Typically every month - used to be irregular  IVF with first 2 pregnancies   3 pregnancy normal     Pert Med Hx  Healthy childhood  Barely had period during high school years due to running track    Fam Hx Significant for:  Mother - sjogren's, RA, AI thyroid  Sister - AI thyroid    Lifestyle Factors affecting health:  Diet - balanced diet for most part, eats veggies, eats good amount of protein  Breakfast is usually kids' leftovers, but not hungry    Exercise - tennis one weekly, always active   Stress - High  Sleep - see above      4 boys - 9, 6 yo twins, 5yo   travels  Works PT as a school psych        REVIEW OF SYSTEMS:   Review of Systems     Negative except for pertinent ROS in HPI  Denies SOB, chest pain, heart palp.   Denies melena or hematochezia.    FAMILY HISTORY:    Family History[1]    MEDICAL HISTORY:   Past Medical History[2]    CURRENT MEDICATIONS:   Current Medications[3]    SOCIAL HISTORY:       Short Social Hx on File[4]    SURGICAL HISTORY:   Past Surgical History[5]    PHYSICAL EXAM:     Vitals:    04/16/25 1334   BP: 98/68   BP Location: Right arm   Patient Position: Sitting   Cuff Size: adult    Pulse: 81   SpO2: 99%   Weight: 107 lb 12.8 oz (48.9 kg)   Height: 5' 3\" (1.6 m)       Physical Exam  Vitals reviewed.   Constitutional:       General: She is not in acute distress.     Appearance: Normal appearance.   HENT:      Mouth/Throat:      Mouth: Mucous membranes are moist.      Pharynx: Oropharynx is clear.   Eyes:      Extraocular Movements: Extraocular movements intact.      Conjunctiva/sclera: Conjunctivae normal.   Cardiovascular:      Rate and Rhythm: Normal rate and regular rhythm.      Heart sounds: Normal heart sounds.   Pulmonary:      Effort: Pulmonary effort is normal.      Breath sounds: Normal breath sounds.   Musculoskeletal:         General: No swelling or deformity.   Skin:     General: Skin is warm and dry.      Capillary Refill: Capillary refill takes 2 to 3 seconds.      Comments: Cold fingertips   Neurological:      General: No focal deficit present.      Mental Status: She is alert and oriented to person, place, and time.      Deep Tendon Reflexes: Reflexes normal.   Psychiatric:         Mood and Affect: Mood normal.         Behavior: Behavior normal.         Thought Content: Thought content normal.         Judgment: Judgment normal.          ASSESSMENT AND PLAN:     Admission on 02/25/2025, Discharged on 02/25/2025   Component Date Value Ref Range Status    POCT INFLUENZA A 02/25/2025 Negative  Negative Final    POCT INFLUENZA B 02/25/2025 Positive (A)  Negative Final      No results found.    1. Chronic fatigue  - Iron And Tibc; Future  - CBC With Differential With Platelet; Future  - Comp Metabolic Panel (14); Future  - Dehydroepiandrosterone Sulfate; Future  - Pregnenolone by MS/MS, Serum; Future  - Ferritin; Future  - Vitamin D; Future  - Vitamin B12; Future  - Vitamin B6; Future  - Folic Acid Serum (Folate); Future  - Assay, Thyroid Stim Hormone; Future  - Free T4, (Free Thyroxine); Future  - Free T3 (Triiodothryronine); Future  - Thyroid Antithyroglobulin AB; Future  - Thyroid  Peroxidase (TPO) AB; Future    2. PMS (premenstrual syndrome)    3. Anemia, unspecified type  - Iron And Tibc; Future  - CBC With Differential With Platelet; Future  - Ferritin; Future  - Antiparietal Cell Antibody [E]; Future    4. Vitamin D deficiency  - Vitamin D; Future    5. Low serum vitamin B12  - Vitamin B12; Future  - Vitamin B6; Future  - Antiparietal Cell Antibody [E]; Future    6. Endocrine axis dysfunction  - Iron And Tibc; Future  - CBC With Differential With Platelet; Future  - Comp Metabolic Panel (14); Future  - Dehydroepiandrosterone Sulfate; Future  - Pregnenolone by MS/MS, Serum; Future  - Ferritin; Future  - Vitamin D; Future  - Vitamin B12; Future  - Vitamin B6; Future  - Folic Acid Serum (Folate); Future  - Assay, Thyroid Stim Hormone; Future  - Free T4, (Free Thyroxine); Future  - Free T3 (Triiodothryronine); Future  - Thyroid Antithyroglobulin AB; Future  - Thyroid Peroxidase (TPO) AB; Future    7. Anxiety    8. Deficiency of multiple nutrient elements  - Iron And Tibc; Future  - CBC With Differential With Platelet; Future  - Comp Metabolic Panel (14); Future  - Dehydroepiandrosterone Sulfate; Future  - Pregnenolone by MS/MS, Serum; Future  - Ferritin; Future  - Vitamin D; Future  - Vitamin B12; Future  - Vitamin B6; Future  - Folic Acid Serum (Folate); Future  - Assay, Thyroid Stim Hormone; Future  - Free T4, (Free Thyroxine); Future  - Free T3 (Triiodothryronine); Future  - Thyroid Antithyroglobulin AB; Future  - Thyroid Peroxidase (TPO) AB; Future  - Zinc, RBC; Future    9. Positional lightheadedness      Time spent with patient: Over 60 minutes spent in chart review and in direct communication with patient obtaining and reviewing history, creating a unique care plan, explaining the rationale for treatment, reviewing potential SE and overall treatment plan,  documenting all clinical information in Epic. Over 50% of this time was in education, counseling and coordination of care.      Problem List Items Addressed This Visit          Endocrine and Metabolic    Vitamin D deficiency    Relevant Orders    Vitamin D       Mental Health    Anxiety       Hematology and Neoplasia    Anemia    Relevant Orders    Iron And Tibc    CBC With Differential With Platelet    Ferritin    Antiparietal Cell Antibody [E]     Other Visit Diagnoses         Chronic fatigue    -  Primary    Relevant Orders    Iron And Tibc    CBC With Differential With Platelet    Comp Metabolic Panel (14)    Dehydroepiandrosterone Sulfate    Pregnenolone by MS/MS, Serum    Ferritin    Vitamin D    Vitamin B12    Vitamin B6    Folic Acid Serum (Folate)    Assay, Thyroid Stim Hormone    Free T4, (Free Thyroxine)    Free T3 (Triiodothryronine)    Thyroid Antithyroglobulin AB    Thyroid Peroxidase (TPO) AB      PMS (premenstrual syndrome)          Low serum vitamin B12        Relevant Orders    Vitamin B12    Vitamin B6    Antiparietal Cell Antibody [E]      Endocrine axis dysfunction        Relevant Orders    Iron And Tibc    CBC With Differential With Platelet    Comp Metabolic Panel (14)    Dehydroepiandrosterone Sulfate    Pregnenolone by MS/MS, Serum    Ferritin    Vitamin D    Vitamin B12    Vitamin B6    Folic Acid Serum (Folate)    Assay, Thyroid Stim Hormone    Free T4, (Free Thyroxine)    Free T3 (Triiodothryronine)    Thyroid Antithyroglobulin AB    Thyroid Peroxidase (TPO) AB      Deficiency of multiple nutrient elements        Relevant Orders    Iron And Tibc    CBC With Differential With Platelet    Comp Metabolic Panel (14)    Dehydroepiandrosterone Sulfate    Pregnenolone by MS/MS, Serum    Ferritin    Vitamin D    Vitamin B12    Vitamin B6    Folic Acid Serum (Folate)    Assay, Thyroid Stim Hormone    Free T4, (Free Thyroxine)    Free T3 (Triiodothryronine)    Thyroid Antithyroglobulin AB    Thyroid Peroxidase (TPO) AB    Zinc, RBC      Positional lightheadedness                 Very pleasant 39-year-old female who  presents today with chronic fatigue, brain fog, and increased anxiousness over the last 2 years.    Pertinent history of anemia -currently supplementing with OTC ferrous sulfate.  --> Will recheck levels at this time    Endocrine: Worsening PMS, mood lability, menstrual cramps and menorrhagia.  Pertinent history of irregular periods when young, IVF.  Suspect low progesterone levels -she will track her periods and let us know over the next 2 cycles how many days in between and then she will get progesterone and estrogen during the correct window of time.  -> Meanwhile we will check other hormones to include adrenal and thyroid  --> Further recommendations below    Pertinent family history of autoimmune thyroid    Intermittent lightheadedness upon standing -recommend increasing fluid intake, adding electrolytes to water.  Blood pressure was on the low side today.  She denies any heart palpitations, chest pain, shortness of breath.    Given further recommendations as below    Orders Placed This Visit:  Orders Placed This Encounter   Procedures    Iron And Tibc    CBC With Differential With Platelet    Comp Metabolic Panel (14)    Dehydroepiandrosterone Sulfate    Pregnenolone by MS/MS, Serum    Ferritin    Vitamin D    Vitamin B12    Vitamin B6    Folic Acid Serum (Folate)    Assay, Thyroid Stim Hormone    Free T4, (Free Thyroxine)    Free T3 (Triiodothryronine)    Thyroid Antithyroglobulin AB    Thyroid Peroxidase (TPO) AB    Zinc, RBC    Antiparietal Cell Antibody [E]     No orders of the defined types were placed in this encounter.      Patient Instructions   Raspberry Leaf Tea to help with menstrual cramps   2.   Motherwort by [employer]        Shake Well Before Using    Take up to 40 drops in 2oz. of water, two to four times per day. Best taken between meals.     3. Nuun (Available at [employer], [employer])   Or:  Catalyte Lemon Lime NSF by Austin Gonzalez helps you restore electrolyte balance.* The electrolyte  mix is designed to replenish the micronutrients lost through sweat during exercise and athletic performance. Studies suggest that an amino acid-electrolyte formula can better increase cellular rehydration compared to formulas without amino acids.* The blend of electrolytes and micronutrients includes taurine, a conditionally essential amino acid that supports rehydration at the cellular level.*   Catalyte is low in calories and is perfect before, during, and after a workout. Simply mix one scoop with water.  Benefits:    Balanced electrolytes helps fight fatigue, promotes calorie control, and helps fuel your muscles and brain.*    Potassium promotes fast recovery and helps reduce cramps.*    Replenishes important minerals in the body that are lost during high intensity activity. Taurine has been shown to promote hydration at the cellular level, which supports performance and recovery.     4. Vitex by Pure Encapsulations         Menstrual Cycle Support: Chaste tree has been associated with supporting breast comfort and positive mood during the menstrual cycle, as suggested by numerous randomized, double blind, placebo-controlled trials. It has also been associated with promoting healthy menstrual luteal phase length and menstrual regularity for some women, helping to maintain healthy reproductive system function.*    Directions: Take 1 tablet twice per day for 3 months       5. Get labs drawn in the morning.  Please check your next 2.'s and then send us a message with how many days in between each period, for example 28 days or 30 days from Day One of your period to Day One of the next period.   ---------------------------------------------------------------------------------  Where to Purchase: [email or web address]  This is our [facility] online dispensary for vitamins and supplements where you receive a 10% discount off of supplement prices! Select the \"Log In\" and then use your  email address to complete creating your personal account. Please call [employer] at [phone], if you need direct help.     The products and items listed below (the “Products”)  and their claims have not been evaluated by the Food and Drug Administration. Dietary products are not intended to treat, prevent, mitigate or cure disease. Ultimately, you must draw your own conclusion as to the efficacy of the Product and immediately stop use of the Products if a negative reaction should arise.  You should always consult a licensed health care professional before starting any supplement, dietary, or exercise program, especially if you are pregnant or have any pre-existing injuries or medical conditions. The patient agrees that the [facility] and its affiliates and its [facility] (“[facility]”) are not liable for the patients use of the Products. [facility] makes no representations or warranties of any kind, expressed or implied, as to the Products, including, but not limited to its efficacy, benefits or outcomes.  Patient agrees to contact his/her healthcare professional and stop use of Products should any reactions arise      Return in about 4 months (around 8/16/2025), or x60min: lab results.    Patient affirmed understanding of plan and all questions were answered.     Naima Powers PA-C       [1]   Family History  Problem Relation Age of Onset    Arthritis Mother         Sjorgrens and RA    Other (Other) Mother         hypothyroid    Hypertension Father     Other (Other) Sister         hypothyroid   [2]   Past Medical History:   Anxiety    Menses painful    Miscarriage within last 12 months    Vitamin D deficiency   [3]   Current Outpatient Medications   Medication Sig Dispense Refill    buPROPion  MG Oral Tablet 24 Hr Take 1 tablet (150 mg total) by mouth daily. 90 tablet 1    valACYclovir (VALTREX) 500 MG Oral Tab Take 1 tablet (500 mg total) by mouth daily. 30 tablet 2    magnesium  oxide 400 MG Oral Tab Take 1 tablet (400 mg total) by mouth daily.      Ferrous Sulfate 325 (65 Fe) MG Oral Tab Take 1 tablet (325 mg total) by mouth daily with breakfast. 90 tablet 3    ergocalciferol 1.25 MG (16832 UT) Oral Cap Take 1 capsule (50,000 Units total) by mouth once a week. (Patient not taking: Reported on 4/16/2025) 24 capsule 0   [4]   Social History  Socioeconomic History    Marital status:    Tobacco Use    Smoking status: Never    Smokeless tobacco: Never   Vaping Use    Vaping status: Never Used   Substance and Sexual Activity    Alcohol use: Yes     Alcohol/week: 1.0 standard drink of alcohol     Types: 1 Glasses of wine per week     Comment: socially    Drug use: No    Sexual activity: Yes     Partners: Male     Comment: no OCP or protection   [5] History reviewed. No pertinent surgical history.

## 2025-04-17 ENCOUNTER — LAB ENCOUNTER (OUTPATIENT)
Dept: LAB | Age: 40
End: 2025-04-17
Attending: PHYSICIAN ASSISTANT
Payer: COMMERCIAL

## 2025-04-17 DIAGNOSIS — E61.7 DEFICIENCY OF MULTIPLE NUTRIENT ELEMENTS: ICD-10-CM

## 2025-04-17 DIAGNOSIS — D64.9 ANEMIA, UNSPECIFIED TYPE: ICD-10-CM

## 2025-04-17 DIAGNOSIS — R53.82 CHRONIC FATIGUE: ICD-10-CM

## 2025-04-17 DIAGNOSIS — E53.8 LOW SERUM VITAMIN B12: ICD-10-CM

## 2025-04-17 DIAGNOSIS — E34.8 ENDOCRINE AXIS DYSFUNCTION: ICD-10-CM

## 2025-04-17 DIAGNOSIS — E55.9 VITAMIN D DEFICIENCY: ICD-10-CM

## 2025-04-17 LAB
ALBUMIN SERPL-MCNC: 4.8 G/DL (ref 3.2–4.8)
ALBUMIN/GLOB SERPL: 1.7 {RATIO} (ref 1–2)
ALP LIVER SERPL-CCNC: 46 U/L (ref 37–98)
ALT SERPL-CCNC: 12 U/L (ref 10–49)
ANION GAP SERPL CALC-SCNC: 8 MMOL/L (ref 0–18)
AST SERPL-CCNC: 23 U/L (ref ?–34)
BASOPHILS # BLD AUTO: 0.06 X10(3) UL (ref 0–0.2)
BASOPHILS NFR BLD AUTO: 1.3 %
BILIRUB SERPL-MCNC: 0.7 MG/DL (ref 0.3–1.2)
BUN BLD-MCNC: 17 MG/DL (ref 9–23)
BUN/CREAT SERPL: 19.5 (ref 10–20)
CALCIUM BLD-MCNC: 9.2 MG/DL (ref 8.7–10.4)
CHLORIDE SERPL-SCNC: 106 MMOL/L (ref 98–112)
CO2 SERPL-SCNC: 27 MMOL/L (ref 21–32)
CREAT BLD-MCNC: 0.87 MG/DL (ref 0.55–1.02)
DEPRECATED HBV CORE AB SER IA-ACNC: 55 NG/ML (ref 50–306)
DEPRECATED RDW RBC AUTO: 48.4 FL (ref 35.1–46.3)
DHEA-S SERPL-MCNC: 139.9 UG/DL (ref 25.9–460.2)
EGFRCR SERPLBLD CKD-EPI 2021: 86 ML/MIN/1.73M2 (ref 60–?)
EOSINOPHIL # BLD AUTO: 0.13 X10(3) UL (ref 0–0.7)
EOSINOPHIL NFR BLD AUTO: 2.8 %
ERYTHROCYTE [DISTWIDTH] IN BLOOD BY AUTOMATED COUNT: 14.2 % (ref 11–15)
FASTING STATUS PATIENT QL REPORTED: YES
FOLATE SERPL-MCNC: 23.2 NG/ML (ref 5.4–?)
GLOBULIN PLAS-MCNC: 2.8 G/DL (ref 2–3.5)
GLUCOSE BLD-MCNC: 83 MG/DL (ref 70–99)
HCT VFR BLD AUTO: 36.1 % (ref 35–48)
HGB BLD-MCNC: 11.7 G/DL (ref 12–16)
IMM GRANULOCYTES # BLD AUTO: 0.01 X10(3) UL (ref 0–1)
IMM GRANULOCYTES NFR BLD: 0.2 %
IRON SATN MFR SERPL: 44 % (ref 15–50)
IRON SERPL-MCNC: 138 UG/DL (ref 50–170)
LYMPHOCYTES # BLD AUTO: 1.99 X10(3) UL (ref 1–4)
LYMPHOCYTES NFR BLD AUTO: 43.2 %
MCH RBC QN AUTO: 29.8 PG (ref 26–34)
MCHC RBC AUTO-ENTMCNC: 32.4 G/DL (ref 31–37)
MCV RBC AUTO: 92.1 FL (ref 80–100)
MONOCYTES # BLD AUTO: 0.32 X10(3) UL (ref 0.1–1)
MONOCYTES NFR BLD AUTO: 6.9 %
NEUTROPHILS # BLD AUTO: 2.1 X10 (3) UL (ref 1.5–7.7)
NEUTROPHILS # BLD AUTO: 2.1 X10(3) UL (ref 1.5–7.7)
NEUTROPHILS NFR BLD AUTO: 45.6 %
OSMOLALITY SERPL CALC.SUM OF ELEC: 293 MOSM/KG (ref 275–295)
PLATELET # BLD AUTO: 264 10(3)UL (ref 150–450)
POTASSIUM SERPL-SCNC: 4.4 MMOL/L (ref 3.5–5.1)
PROT SERPL-MCNC: 7.6 G/DL (ref 5.7–8.2)
RBC # BLD AUTO: 3.92 X10(6)UL (ref 3.8–5.3)
SODIUM SERPL-SCNC: 141 MMOL/L (ref 136–145)
T3FREE SERPL-MCNC: 3.22 PG/ML (ref 2.4–4.2)
T4 FREE SERPL-MCNC: 1.2 NG/DL (ref 0.8–1.7)
THYROGLOB SERPL-MCNC: 173 U/ML (ref ?–60)
THYROPEROXIDASE AB SERPL-ACNC: 51 U/ML (ref ?–60)
TOTAL IRON BINDING CAPACITY: 313 UG/DL (ref 250–425)
TRANSFERRIN SERPL-MCNC: 253 MG/DL (ref 250–380)
TSI SER-ACNC: 1.75 UIU/ML (ref 0.55–4.78)
VIT B12 SERPL-MCNC: 553 PG/ML (ref 211–911)
VIT D+METAB SERPL-MCNC: 37.7 NG/ML (ref 30–100)
WBC # BLD AUTO: 4.6 X10(3) UL (ref 4–11)

## 2025-04-17 PROCEDURE — 83540 ASSAY OF IRON: CPT | Performed by: PHYSICIAN ASSISTANT

## 2025-04-17 PROCEDURE — 82728 ASSAY OF FERRITIN: CPT | Performed by: PHYSICIAN ASSISTANT

## 2025-04-17 PROCEDURE — 83516 IMMUNOASSAY NONANTIBODY: CPT | Performed by: PHYSICIAN ASSISTANT

## 2025-04-17 PROCEDURE — 82627 DEHYDROEPIANDROSTERONE: CPT | Performed by: PHYSICIAN ASSISTANT

## 2025-04-17 PROCEDURE — 82607 VITAMIN B-12: CPT | Performed by: PHYSICIAN ASSISTANT

## 2025-04-17 PROCEDURE — 86800 THYROGLOBULIN ANTIBODY: CPT | Performed by: PHYSICIAN ASSISTANT

## 2025-04-17 PROCEDURE — 84140 ASSAY OF PREGNENOLONE: CPT | Performed by: PHYSICIAN ASSISTANT

## 2025-04-17 PROCEDURE — 82306 VITAMIN D 25 HYDROXY: CPT | Performed by: PHYSICIAN ASSISTANT

## 2025-04-17 PROCEDURE — 84439 ASSAY OF FREE THYROXINE: CPT | Performed by: PHYSICIAN ASSISTANT

## 2025-04-17 PROCEDURE — 86376 MICROSOMAL ANTIBODY EACH: CPT | Performed by: PHYSICIAN ASSISTANT

## 2025-04-17 PROCEDURE — 80050 GENERAL HEALTH PANEL: CPT | Performed by: PHYSICIAN ASSISTANT

## 2025-04-17 PROCEDURE — 82746 ASSAY OF FOLIC ACID SERUM: CPT | Performed by: PHYSICIAN ASSISTANT

## 2025-04-17 PROCEDURE — 84466 ASSAY OF TRANSFERRIN: CPT | Performed by: PHYSICIAN ASSISTANT

## 2025-04-17 PROCEDURE — 84481 FREE ASSAY (FT-3): CPT | Performed by: PHYSICIAN ASSISTANT

## 2025-04-17 PROCEDURE — 84207 ASSAY OF VITAMIN B-6: CPT | Performed by: PHYSICIAN ASSISTANT

## 2025-04-17 PROCEDURE — 84630 ASSAY OF ZINC: CPT | Performed by: PHYSICIAN ASSISTANT

## 2025-04-18 LAB — PARIETAL CELL AB: 6.3 UNITS

## 2025-04-19 DIAGNOSIS — B00.1 RECURRENT COLD SORES: ICD-10-CM

## 2025-04-19 DIAGNOSIS — B00.2 ORAL HERPES: ICD-10-CM

## 2025-04-20 LAB — VITAMIN B6: 10.9 UG/L

## 2025-04-22 RX ORDER — VALACYCLOVIR HYDROCHLORIDE 1 G/1
2000 TABLET, FILM COATED ORAL 2 TIMES DAILY
Qty: 28 TABLET | Refills: 3 | Status: SHIPPED | OUTPATIENT
Start: 2025-04-22

## 2025-04-22 RX ORDER — VALACYCLOVIR HYDROCHLORIDE 500 MG/1
500 TABLET, FILM COATED ORAL DAILY
Qty: 30 TABLET | Refills: 2 | Status: CANCELLED | OUTPATIENT
Start: 2025-04-22

## 2025-04-22 NOTE — TELEPHONE ENCOUNTER
Refill passed per [facility] protocols.    Requested Prescriptions   Pending Prescriptions Disp Refills    valACYclovir (VALTREX) 1000 MG Oral Tab 28 tablet 3     Sig: Take 2 tablets (2000 mg total) by mouth twice daily for one day, when needed for out break.       Herpes Agent Protocol Passed - 4/22/2025  6:59 PM        11/27/24 12:51 PM  I take it as needed when I get an outbreak or feel one coming, which tends to increase in the colder months.

## 2025-04-23 LAB — ZINC, RBC: 1147 UG/DL

## 2025-05-01 LAB — PREGNENOLONE: 29 NG/DL

## 2025-05-20 ENCOUNTER — PATIENT MESSAGE (OUTPATIENT)
Dept: INTEGRATIVE MEDICINE | Facility: CLINIC | Age: 40
End: 2025-05-20

## 2025-05-20 DIAGNOSIS — D64.9 ANEMIA, UNSPECIFIED TYPE: ICD-10-CM

## 2025-05-20 DIAGNOSIS — E34.8 ENDOCRINE AXIS DYSFUNCTION: ICD-10-CM

## 2025-05-20 DIAGNOSIS — N94.3 PMS (PREMENSTRUAL SYNDROME): Primary | ICD-10-CM

## 2025-05-21 ENCOUNTER — OFFICE VISIT (OUTPATIENT)
Dept: FAMILY MEDICINE CLINIC | Facility: CLINIC | Age: 40
End: 2025-05-21
Payer: COMMERCIAL

## 2025-05-21 VITALS
DIASTOLIC BLOOD PRESSURE: 58 MMHG | SYSTOLIC BLOOD PRESSURE: 94 MMHG | OXYGEN SATURATION: 98 % | HEIGHT: 63 IN | WEIGHT: 106 LBS | BODY MASS INDEX: 18.78 KG/M2 | HEART RATE: 73 BPM

## 2025-05-21 DIAGNOSIS — Z12.31 ENCOUNTER FOR SCREENING MAMMOGRAM FOR MALIGNANT NEOPLASM OF BREAST: ICD-10-CM

## 2025-05-21 DIAGNOSIS — G57.01 PIRIFORMIS SYNDROME OF RIGHT SIDE: Primary | ICD-10-CM

## 2025-05-21 PROCEDURE — G2211 COMPLEX E/M VISIT ADD ON: HCPCS | Performed by: STUDENT IN AN ORGANIZED HEALTH CARE EDUCATION/TRAINING PROGRAM

## 2025-05-21 PROCEDURE — 3008F BODY MASS INDEX DOCD: CPT | Performed by: STUDENT IN AN ORGANIZED HEALTH CARE EDUCATION/TRAINING PROGRAM

## 2025-05-21 PROCEDURE — 99213 OFFICE O/P EST LOW 20 MIN: CPT | Performed by: STUDENT IN AN ORGANIZED HEALTH CARE EDUCATION/TRAINING PROGRAM

## 2025-05-21 PROCEDURE — 3078F DIAST BP <80 MM HG: CPT | Performed by: STUDENT IN AN ORGANIZED HEALTH CARE EDUCATION/TRAINING PROGRAM

## 2025-05-21 PROCEDURE — 3074F SYST BP LT 130 MM HG: CPT | Performed by: STUDENT IN AN ORGANIZED HEALTH CARE EDUCATION/TRAINING PROGRAM

## 2025-05-21 RX ORDER — METHOCARBAMOL 750 MG/1
750 TABLET, FILM COATED ORAL 3 TIMES DAILY PRN
Qty: 60 TABLET | Refills: 0 | Status: SHIPPED | OUTPATIENT
Start: 2025-05-21

## 2025-05-21 NOTE — PROGRESS NOTES
HPI:      Patient ID: Fifi Garcia is a 39 year old female.    HPI  Pt presenting with Right low back pain.    Reports persistent discomfort in Right low back radiating into Right buttocks  Worse upon waking, bending over sink  Denies leg weakness, paresthesias  Chiropractor, Acupuncture, exercises  No meds, topicals  No home stretching      Review of Systems   A comprehensive 10 point review of systems was completed.  Pertinent positives and negatives noted in the the HPI.     Current Medications[1]  Allergies:Allergies[2]   Vitals:    05/21/25 1306   BP: 94/58   Pulse: 73   SpO2: 98%   Weight: 106 lb (48.1 kg)   Height: 5' 3\" (1.6 m)       Body mass index is 18.78 kg/m².   PHYSICAL EXAM:   Physical Exam  Vitals reviewed.   Constitutional:       General: She is not in acute distress.  Eyes:      Conjunctiva/sclera: Conjunctivae normal.   Cardiovascular:      Rate and Rhythm: Normal rate and regular rhythm.      Pulses: Normal pulses.   Pulmonary:      Effort: Pulmonary effort is normal. No respiratory distress.      Breath sounds: Normal breath sounds.   Musculoskeletal:      Cervical back: Normal range of motion.      Lumbar back: No tenderness. Negative right straight leg raise test and negative left straight leg raise test.      Right lower leg: No edema.      Left lower leg: No edema.        Legs:       Comments: Right gluteal TTP overlying piriformis   Neurological:      General: No focal deficit present.      Mental Status: She is alert and oriented to person, place, and time. Mental status is at baseline.   Psychiatric:         Mood and Affect: Mood normal.         Behavior: Behavior normal.             ASSESSMENT/PLAN:   1. Piriformis syndrome of right side  - provided with gentle stretching/strengthening exercises  - encouraged to increase hydration and rest as able  - Tylenol/NSAIDs as needed, Robaxin PRN  - consider PT for refractory symptoms  - discussed role of XR  - to call with any questions or  concerns  - methocarbamol 750 MG Oral Tab; Take 1 tablet (750 mg total) by mouth 3 (three) times daily as needed.  Dispense: 60 tablet; Refill: 0  - Physical Therapy Referral - [place] Locations  - XR LUMBAR SPINE (MIN 4 VIEWS) (CPT=72110); Future    2. Encounter for screening mammogram for malignant neoplasm of breast  - [facility] MERCEDES 2D+3D SCREENING BILAT (CPT=77067/54981); Future    Pt verbalized understanding and agrees with plan.    No orders of the defined types were placed in this encounter.      Meds This Visit:  Requested Prescriptions     Signed Prescriptions Disp Refills    methocarbamol 750 MG Oral Tab 60 tablet 0     Sig: Take 1 tablet (750 mg total) by mouth 3 (three) times daily as needed.       Imaging & Referrals:  PHYSICAL THERAPY - INTERNAL  [facility] MERCEDES 2D+3D SCREENING BILAT (CPT=77067/61248)  XR LUMBAR SPINE (MIN 4 VIEWS) (CPT=72110)         ID#[id number]       [1]   Current Outpatient Medications   Medication Sig Dispense Refill    methocarbamol 750 MG Oral Tab Take 1 tablet (750 mg total) by mouth 3 (three) times daily as needed. 60 tablet 0    valACYclovir (VALTREX) 1 G Oral Tab Take 2 tablets (2,000 mg total) by mouth 2 (two) times daily. Take for 1 day when needed for outbreak. 28 tablet 3    buPROPion  MG Oral Tablet 24 Hr Take 1 tablet (150 mg total) by mouth daily. 90 tablet 1    Ferrous Sulfate 325 (65 Fe) MG Oral Tab Take 1 tablet (325 mg total) by mouth daily with breakfast. 90 tablet 3   [2] No Known Allergies

## 2025-07-09 ENCOUNTER — HOSPITAL ENCOUNTER (OUTPATIENT)
Dept: MAMMOGRAPHY | Age: 40
Discharge: HOME | End: 2025-07-09
Attending: STUDENT IN AN ORGANIZED HEALTH CARE EDUCATION/TRAINING PROGRAM
Payer: COMMERCIAL

## 2025-07-09 DIAGNOSIS — Z12.31 ENCOUNTER FOR SCREENING MAMMOGRAM FOR MALIGNANT NEOPLASM OF BREAST: ICD-10-CM

## 2025-07-09 PROCEDURE — 77063 BREAST TOMOSYNTHESIS BI: CPT | Performed by: STUDENT IN AN ORGANIZED HEALTH CARE EDUCATION/TRAINING PROGRAM

## 2025-07-09 PROCEDURE — 77067 SCR MAMMO BI INCL CAD: CPT | Performed by: STUDENT IN AN ORGANIZED HEALTH CARE EDUCATION/TRAINING PROGRAM

## 2025-07-13 DIAGNOSIS — F41.8 DEPRESSION WITH ANXIETY: ICD-10-CM

## 2025-07-17 DIAGNOSIS — F41.8 DEPRESSION WITH ANXIETY: ICD-10-CM

## 2025-07-17 RX ORDER — BUPROPION HYDROCHLORIDE 150 MG/1
150 TABLET ORAL DAILY
Qty: 90 TABLET | Refills: 1 | OUTPATIENT
Start: 2025-07-17

## 2025-07-17 RX ORDER — BUPROPION HYDROCHLORIDE 150 MG/1
150 TABLET ORAL DAILY
Qty: 90 TABLET | Refills: 3 | Status: SHIPPED | OUTPATIENT
Start: 2025-07-17

## 2025-07-18 ENCOUNTER — LAB ENCOUNTER (OUTPATIENT)
Dept: LAB | Age: 40
End: 2025-07-18
Attending: PHYSICIAN ASSISTANT
Payer: COMMERCIAL

## 2025-07-18 DIAGNOSIS — N94.3 PMS (PREMENSTRUAL SYNDROME): ICD-10-CM

## 2025-07-18 DIAGNOSIS — E34.8 ENDOCRINE AXIS DYSFUNCTION: ICD-10-CM

## 2025-07-18 DIAGNOSIS — D64.9 ANEMIA, UNSPECIFIED TYPE: ICD-10-CM

## 2025-07-18 LAB
BASOPHILS # BLD AUTO: 0.08 X10(3) UL (ref 0–0.2)
BASOPHILS NFR BLD AUTO: 1.3 %
DEPRECATED RDW RBC AUTO: 45.6 FL (ref 35.1–46.3)
DHEA-S SERPL-MCNC: 163.2 UG/DL (ref 25.9–460.2)
EOSINOPHIL # BLD AUTO: 0.21 X10(3) UL (ref 0–0.7)
EOSINOPHIL NFR BLD AUTO: 3.5 %
ERYTHROCYTE [DISTWIDTH] IN BLOOD BY AUTOMATED COUNT: 13.3 % (ref 11–15)
HCT VFR BLD AUTO: 36.2 % (ref 35–48)
HGB BLD-MCNC: 11.7 G/DL (ref 12–16)
IMM GRANULOCYTES # BLD AUTO: 0.01 X10(3) UL (ref 0–1)
IMM GRANULOCYTES NFR BLD: 0.2 %
LYMPHOCYTES # BLD AUTO: 2.38 X10(3) UL (ref 1–4)
LYMPHOCYTES NFR BLD AUTO: 39.9 %
MCH RBC QN AUTO: 30.3 PG (ref 26–34)
MCHC RBC AUTO-ENTMCNC: 32.3 G/DL (ref 31–37)
MCV RBC AUTO: 93.8 FL (ref 80–100)
MONOCYTES # BLD AUTO: 0.52 X10(3) UL (ref 0.1–1)
MONOCYTES NFR BLD AUTO: 8.7 %
NEUTROPHILS # BLD AUTO: 2.77 X10 (3) UL (ref 1.5–7.7)
NEUTROPHILS # BLD AUTO: 2.77 X10(3) UL (ref 1.5–7.7)
NEUTROPHILS NFR BLD AUTO: 46.4 %
PLATELET # BLD AUTO: 255 10(3)UL (ref 150–450)
PROGEST SERPL-MCNC: 4.42 NG/ML
RBC # BLD AUTO: 3.86 X10(6)UL (ref 3.8–5.3)
WBC # BLD AUTO: 6 X10(3) UL (ref 4–11)

## 2025-07-18 PROCEDURE — 85025 COMPLETE CBC W/AUTO DIFF WBC: CPT

## 2025-07-18 PROCEDURE — 84140 ASSAY OF PREGNENOLONE: CPT

## 2025-07-18 PROCEDURE — 82627 DEHYDROEPIANDROSTERONE: CPT

## 2025-07-18 PROCEDURE — 36415 COLL VENOUS BLD VENIPUNCTURE: CPT

## 2025-07-18 PROCEDURE — 82671 ASSAY OF ESTROGENS: CPT

## 2025-07-18 PROCEDURE — 84410 TESTOSTERONE BIOAVAILABLE: CPT

## 2025-07-18 PROCEDURE — 84144 ASSAY OF PROGESTERONE: CPT

## 2025-07-22 LAB
ESTRADIOL: 123 PG/ML
ESTRONE: 77 PG/ML

## 2025-07-24 LAB
PREGNENOLONE: 124 NG/DL
SEX HORM BIND GLOB: 97.1 NMOL/L
TESTOST % FREE+WEAK BND: 11.1 %
TESTOST FREE+WEAK BND: 3.4 NG/DL
TESTOSTERONE TOT /MS: 31 NG/DL